# Patient Record
Sex: FEMALE | Race: BLACK OR AFRICAN AMERICAN | URBAN - METROPOLITAN AREA
[De-identification: names, ages, dates, MRNs, and addresses within clinical notes are randomized per-mention and may not be internally consistent; named-entity substitution may affect disease eponyms.]

---

## 2017-08-02 ENCOUNTER — APPOINTMENT (OUTPATIENT)
Dept: GENERAL RADIOLOGY | Facility: CLINIC | Age: 22
End: 2017-08-02
Attending: EMERGENCY MEDICINE
Payer: COMMERCIAL

## 2017-08-02 ENCOUNTER — HOSPITAL ENCOUNTER (EMERGENCY)
Facility: CLINIC | Age: 22
Discharge: PSYCHIATRIC OR SUBSTANCE USE FACILITY | End: 2017-08-03
Attending: EMERGENCY MEDICINE | Admitting: EMERGENCY MEDICINE
Payer: COMMERCIAL

## 2017-08-02 DIAGNOSIS — F29 PSYCHOSIS, UNSPECIFIED PSYCHOSIS TYPE (H): ICD-10-CM

## 2017-08-02 LAB
ALBUMIN SERPL-MCNC: 3.3 G/DL (ref 3.4–5)
ALP SERPL-CCNC: 61 U/L (ref 40–150)
ALT SERPL W P-5'-P-CCNC: 54 U/L (ref 0–50)
ANION GAP SERPL CALCULATED.3IONS-SCNC: 12 MMOL/L (ref 3–14)
AST SERPL W P-5'-P-CCNC: 38 U/L (ref 0–45)
BASOPHILS # BLD AUTO: 0 10E9/L (ref 0–0.2)
BASOPHILS NFR BLD AUTO: 0.3 %
BILIRUB SERPL-MCNC: 0.6 MG/DL (ref 0.2–1.3)
BUN SERPL-MCNC: 6 MG/DL (ref 7–30)
CALCIUM SERPL-MCNC: 9.3 MG/DL (ref 8.5–10.1)
CHLORIDE SERPL-SCNC: 104 MMOL/L (ref 94–109)
CO2 SERPL-SCNC: 22 MMOL/L (ref 20–32)
CREAT SERPL-MCNC: 0.54 MG/DL (ref 0.52–1.04)
DIFFERENTIAL METHOD BLD: ABNORMAL
EOSINOPHIL # BLD AUTO: 0.3 10E9/L (ref 0–0.7)
EOSINOPHIL NFR BLD AUTO: 2.5 %
ERYTHROCYTE [DISTWIDTH] IN BLOOD BY AUTOMATED COUNT: 14.6 % (ref 10–15)
GFR SERPL CREATININE-BSD FRML MDRD: ABNORMAL ML/MIN/1.7M2
GLUCOSE SERPL-MCNC: 94 MG/DL (ref 70–99)
HCG SERPL QL: NEGATIVE
HCT VFR BLD AUTO: 36.4 % (ref 35–47)
HGB BLD-MCNC: 12.5 G/DL (ref 11.7–15.7)
IMM GRANULOCYTES # BLD: 0 10E9/L (ref 0–0.4)
IMM GRANULOCYTES NFR BLD: 0.2 %
INTERPRETATION ECG - MUSE: NORMAL
LIPASE SERPL-CCNC: 110 U/L (ref 73–393)
LYMPHOCYTES # BLD AUTO: 1.5 10E9/L (ref 0.8–5.3)
LYMPHOCYTES NFR BLD AUTO: 11.6 %
MCH RBC QN AUTO: 27.5 PG (ref 26.5–33)
MCHC RBC AUTO-ENTMCNC: 34.3 G/DL (ref 31.5–36.5)
MCV RBC AUTO: 80 FL (ref 78–100)
MONOCYTES # BLD AUTO: 1.5 10E9/L (ref 0–1.3)
MONOCYTES NFR BLD AUTO: 11.5 %
NEUTROPHILS # BLD AUTO: 9.6 10E9/L (ref 1.6–8.3)
NEUTROPHILS NFR BLD AUTO: 73.9 %
NRBC # BLD AUTO: 0 10*3/UL
NRBC BLD AUTO-RTO: 0 /100
PLATELET # BLD AUTO: 419 10E9/L (ref 150–450)
POTASSIUM SERPL-SCNC: 3.2 MMOL/L (ref 3.4–5.3)
PROT SERPL-MCNC: 8 G/DL (ref 6.8–8.8)
RBC # BLD AUTO: 4.55 10E12/L (ref 3.8–5.2)
SODIUM SERPL-SCNC: 138 MMOL/L (ref 133–144)
WBC # BLD AUTO: 13 10E9/L (ref 4–11)

## 2017-08-02 PROCEDURE — 90791 PSYCH DIAGNOSTIC EVALUATION: CPT

## 2017-08-02 PROCEDURE — 93005 ELECTROCARDIOGRAM TRACING: CPT

## 2017-08-02 PROCEDURE — S0166 INJ OLANZAPINE 2.5MG: HCPCS | Performed by: EMERGENCY MEDICINE

## 2017-08-02 PROCEDURE — 80053 COMPREHEN METABOLIC PANEL: CPT | Performed by: EMERGENCY MEDICINE

## 2017-08-02 PROCEDURE — 99285 EMERGENCY DEPT VISIT HI MDM: CPT | Mod: 25

## 2017-08-02 PROCEDURE — 25000125 ZZHC RX 250: Performed by: EMERGENCY MEDICINE

## 2017-08-02 PROCEDURE — 96372 THER/PROPH/DIAG INJ SC/IM: CPT

## 2017-08-02 PROCEDURE — 85025 COMPLETE CBC W/AUTO DIFF WBC: CPT | Performed by: EMERGENCY MEDICINE

## 2017-08-02 PROCEDURE — 83690 ASSAY OF LIPASE: CPT | Performed by: EMERGENCY MEDICINE

## 2017-08-02 PROCEDURE — 71020 XR CHEST 2 VW: CPT

## 2017-08-02 PROCEDURE — 84703 CHORIONIC GONADOTROPIN ASSAY: CPT | Performed by: EMERGENCY MEDICINE

## 2017-08-02 PROCEDURE — 73130 X-RAY EXAM OF HAND: CPT | Mod: 50

## 2017-08-02 RX ORDER — OLANZAPINE 10 MG/2ML
10 INJECTION, POWDER, FOR SOLUTION INTRAMUSCULAR DAILY PRN
Status: DISCONTINUED | OUTPATIENT
Start: 2017-08-02 | End: 2017-08-03 | Stop reason: HOSPADM

## 2017-08-02 RX ORDER — OLANZAPINE 5 MG/1
5 TABLET, ORALLY DISINTEGRATING ORAL AT BEDTIME
Status: DISCONTINUED | OUTPATIENT
Start: 2017-08-02 | End: 2017-08-03 | Stop reason: HOSPADM

## 2017-08-02 RX ORDER — OLANZAPINE 10 MG/2ML
5 INJECTION, POWDER, FOR SOLUTION INTRAMUSCULAR DAILY PRN
Status: DISCONTINUED | OUTPATIENT
Start: 2017-08-02 | End: 2017-08-03 | Stop reason: HOSPADM

## 2017-08-02 RX ADMIN — OLANZAPINE 10 MG: 10 INJECTION, POWDER, FOR SOLUTION INTRAMUSCULAR at 19:31

## 2017-08-02 ASSESSMENT — ENCOUNTER SYMPTOMS
CHEST TIGHTNESS: 0
SHORTNESS OF BREATH: 0
COUGH: 1
ABDOMINAL PAIN: 0
WOUND: 1
BACK PAIN: 1
NAUSEA: 0
ARTHRALGIAS: 1
VOMITING: 0
HEADACHES: 1
DIFFICULTY URINATING: 0
MYALGIAS: 1
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
## 2017-08-02 NOTE — ED NOTES
"Pt awake and confused.  Speaking into her call light and stating, \"I want my president.  I was Micha Fierro!  I am not diabetic.  I do not need an amputation.\"  Pt referring to the TV commercials as if they were conversing with her.  Pt called 911 a few minutes later and told them that she needed a .  MD aware.  \"I just gave birth to a baby.\"  "
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
## 2017-08-02 NOTE — ED NOTES
"Discussed with SARS nurse on pt being unable to consent to exam.  SARS nurse reports pt stating: \"I'm a fucking [place] citizen.  I need a baby shower.\"  "

## 2017-08-03 ENCOUNTER — HOSPITAL ENCOUNTER (INPATIENT)
Facility: CLINIC | Age: 22
LOS: 26 days | Discharge: HOME | End: 2017-08-29
Attending: PSYCHIATRY & NEUROLOGY | Admitting: PSYCHIATRY & NEUROLOGY
Payer: COMMERCIAL

## 2017-08-03 VITALS
DIASTOLIC BLOOD PRESSURE: 65 MMHG | BODY MASS INDEX: 31.22 KG/M2 | OXYGEN SATURATION: 100 % | HEART RATE: 86 BPM | TEMPERATURE: 98.3 F | SYSTOLIC BLOOD PRESSURE: 110 MMHG | WEIGHT: 206 LBS | RESPIRATION RATE: 18 BRPM | HEIGHT: 68 IN

## 2017-08-03 DIAGNOSIS — F31.9 BIPOLAR I DISORDER (H): ICD-10-CM

## 2017-08-03 DIAGNOSIS — F25.0 SCHIZOAFFECTIVE DISORDER, BIPOLAR TYPE (H): ICD-10-CM

## 2017-08-03 DIAGNOSIS — F51.05 INSOMNIA DUE TO OTHER MENTAL DISORDER: Primary | ICD-10-CM

## 2017-08-03 DIAGNOSIS — F99 INSOMNIA DUE TO OTHER MENTAL DISORDER: Primary | ICD-10-CM

## 2017-08-03 PROBLEM — F29 PSYCHOSIS (H): Status: ACTIVE | Noted: 2017-08-03

## 2017-08-03 LAB
ALBUMIN UR-MCNC: 30 MG/DL
AMPHETAMINES UR QL SCN: ABNORMAL
APPEARANCE UR: CLEAR
BARBITURATES UR QL: ABNORMAL
BENZODIAZ UR QL: ABNORMAL
BILIRUB UR QL STRIP: ABNORMAL
CANNABINOIDS UR QL SCN: ABNORMAL
COCAINE UR QL: ABNORMAL
COLOR UR AUTO: YELLOW
ETHANOL UR QL SCN: ABNORMAL
GLUCOSE UR STRIP-MCNC: NEGATIVE MG/DL
HCG UR QL: NEGATIVE
HGB UR QL STRIP: NEGATIVE
KETONES UR STRIP-MCNC: NEGATIVE MG/DL
LEUKOCYTE ESTERASE UR QL STRIP: ABNORMAL
NITRATE UR QL: NEGATIVE
OPIATES UR QL SCN: ABNORMAL
PCP UR QL SCN: ABNORMAL
PH UR STRIP: 6.5 PH (ref 5–7)
SP GR UR STRIP: 1.02 (ref 1–1.03)
URN SPEC COLLECT METH UR: ABNORMAL
UROBILINOGEN UR STRIP-MCNC: >12 MG/DL (ref 0–2)

## 2017-08-03 PROCEDURE — 80307 DRUG TEST PRSMV CHEM ANLYZR: CPT | Performed by: PSYCHIATRY & NEUROLOGY

## 2017-08-03 PROCEDURE — 25000125 ZZHC RX 250: Performed by: PSYCHIATRY & NEUROLOGY

## 2017-08-03 PROCEDURE — S0166 INJ OLANZAPINE 2.5MG: HCPCS | Performed by: EMERGENCY MEDICINE

## 2017-08-03 PROCEDURE — 99223 1ST HOSP IP/OBS HIGH 75: CPT | Mod: AI | Performed by: PSYCHIATRY & NEUROLOGY

## 2017-08-03 PROCEDURE — 80320 DRUG SCREEN QUANTALCOHOLS: CPT | Performed by: PSYCHIATRY & NEUROLOGY

## 2017-08-03 PROCEDURE — 12400003 ZZH R&B MH CRITICAL UMMC

## 2017-08-03 PROCEDURE — 81003 URINALYSIS AUTO W/O SCOPE: CPT | Performed by: PSYCHIATRY & NEUROLOGY

## 2017-08-03 PROCEDURE — 25000132 ZZH RX MED GY IP 250 OP 250 PS 637: Performed by: PSYCHIATRY & NEUROLOGY

## 2017-08-03 PROCEDURE — 25000125 ZZHC RX 250: Performed by: EMERGENCY MEDICINE

## 2017-08-03 PROCEDURE — 81025 URINE PREGNANCY TEST: CPT | Performed by: PSYCHIATRY & NEUROLOGY

## 2017-08-03 PROCEDURE — S0166 INJ OLANZAPINE 2.5MG: HCPCS | Performed by: PSYCHIATRY & NEUROLOGY

## 2017-08-03 RX ORDER — OLANZAPINE 10 MG/2ML
10 INJECTION, POWDER, FOR SOLUTION INTRAMUSCULAR DAILY PRN
Status: DISCONTINUED | OUTPATIENT
Start: 2017-08-03 | End: 2017-08-03 | Stop reason: HOSPADM

## 2017-08-03 RX ORDER — TRAZODONE HYDROCHLORIDE 50 MG/1
50 TABLET, FILM COATED ORAL
Status: DISCONTINUED | OUTPATIENT
Start: 2017-08-03 | End: 2017-08-17

## 2017-08-03 RX ORDER — LORAZEPAM 1 MG/1
1 TABLET ORAL AT BEDTIME
Status: DISCONTINUED | OUTPATIENT
Start: 2017-08-03 | End: 2017-08-29 | Stop reason: HOSPADM

## 2017-08-03 RX ORDER — ACETAMINOPHEN 325 MG/1
650 TABLET ORAL EVERY 4 HOURS PRN
Status: DISCONTINUED | OUTPATIENT
Start: 2017-08-03 | End: 2017-08-23

## 2017-08-03 RX ORDER — BISACODYL 10 MG
10 SUPPOSITORY, RECTAL RECTAL DAILY PRN
Status: DISCONTINUED | OUTPATIENT
Start: 2017-08-03 | End: 2017-08-29 | Stop reason: HOSPADM

## 2017-08-03 RX ORDER — ALUMINA, MAGNESIA, AND SIMETHICONE 2400; 2400; 240 MG/30ML; MG/30ML; MG/30ML
30 SUSPENSION ORAL EVERY 4 HOURS PRN
Status: DISCONTINUED | OUTPATIENT
Start: 2017-08-03 | End: 2017-08-29 | Stop reason: HOSPADM

## 2017-08-03 RX ORDER — OLANZAPINE 10 MG/2ML
10 INJECTION, POWDER, FOR SOLUTION INTRAMUSCULAR
Status: DISCONTINUED | OUTPATIENT
Start: 2017-08-03 | End: 2017-08-03

## 2017-08-03 RX ORDER — HYDROXYZINE HYDROCHLORIDE 25 MG/1
25-50 TABLET, FILM COATED ORAL EVERY 4 HOURS PRN
Status: DISCONTINUED | OUTPATIENT
Start: 2017-08-03 | End: 2017-08-29 | Stop reason: HOSPADM

## 2017-08-03 RX ORDER — HALOPERIDOL 5 MG/1
5 TABLET ORAL EVERY 6 HOURS PRN
Status: DISCONTINUED | OUTPATIENT
Start: 2017-08-03 | End: 2017-08-10

## 2017-08-03 RX ORDER — OLANZAPINE 10 MG/1
10 TABLET ORAL
Status: DISCONTINUED | OUTPATIENT
Start: 2017-08-03 | End: 2017-08-03

## 2017-08-03 RX ORDER — LORAZEPAM 1 MG/1
1-2 TABLET ORAL EVERY 6 HOURS PRN
Status: DISCONTINUED | OUTPATIENT
Start: 2017-08-03 | End: 2017-08-10

## 2017-08-03 RX ORDER — HALOPERIDOL 5 MG/ML
5 INJECTION INTRAMUSCULAR EVERY 6 HOURS PRN
Status: DISCONTINUED | OUTPATIENT
Start: 2017-08-03 | End: 2017-08-10

## 2017-08-03 RX ORDER — BENZTROPINE MESYLATE 1 MG/ML
1 INJECTION, SOLUTION INTRAMUSCULAR; INTRAVENOUS EVERY 6 HOURS PRN
Status: DISCONTINUED | OUTPATIENT
Start: 2017-08-03 | End: 2017-08-10

## 2017-08-03 RX ORDER — BENZTROPINE MESYLATE 1 MG/1
1 TABLET ORAL EVERY 6 HOURS PRN
Status: DISCONTINUED | OUTPATIENT
Start: 2017-08-03 | End: 2017-08-10

## 2017-08-03 RX ORDER — LORAZEPAM 2 MG/ML
1-2 INJECTION INTRAMUSCULAR EVERY 6 HOURS PRN
Status: DISCONTINUED | OUTPATIENT
Start: 2017-08-03 | End: 2017-08-10

## 2017-08-03 RX ORDER — OLANZAPINE 20 MG/1
20 TABLET ORAL AT BEDTIME
Status: DISCONTINUED | OUTPATIENT
Start: 2017-08-03 | End: 2017-08-08

## 2017-08-03 RX ADMIN — OLANZAPINE 10 MG: 10 INJECTION, POWDER, LYOPHILIZED, FOR SOLUTION INTRAMUSCULAR at 06:29

## 2017-08-03 RX ADMIN — LORAZEPAM 2 MG: 1 TABLET ORAL at 08:12

## 2017-08-03 RX ADMIN — OLANZAPINE 20 MG: 20 TABLET, FILM COATED ORAL at 21:07

## 2017-08-03 RX ADMIN — LORAZEPAM 1 MG: 1 TABLET ORAL at 21:07

## 2017-08-03 RX ADMIN — BENZTROPINE MESYLATE 1 MG: 1 TABLET ORAL at 08:12

## 2017-08-03 RX ADMIN — OLANZAPINE 10 MG: 10 INJECTION, POWDER, FOR SOLUTION INTRAMUSCULAR at 00:44

## 2017-08-03 ASSESSMENT — ACTIVITIES OF DAILY LIVING (ADL)
LAUNDRY: WITH SUPERVISION
DRESS: INDEPENDENT
GROOMING: INDEPENDENT
ORAL_HYGIENE: INDEPENDENT
GROOMING: INDEPENDENT
ORAL_HYGIENE: INDEPENDENT
DRESS: SCRUBS (BEHAVIORAL HEALTH);INDEPENDENT
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
## 2017-08-03 NOTE — PROGRESS NOTES
PATIENT BELONGINGS:    Items in Patient Locker:  -1 pair of sandals, 5 multicolored sandals, 1 monochrome shawl/head covering.    ---No Cash, No Credit/Debit Cards, and No Medications (other than noted above) at the time of admission.    ADMISSION:  I am responsible for any personal items that are not sent to the safe or pharmacy. [facility] is not responsible for loss, theft or damage of any property in my possession.    Patient Signature _____________________ Date/Time _____________________    Staff Signature _______________________ Date/Time _____________________    2nd Staff person, if patient is unable/unwilling to sign  ___________________________________ Date/Time _____________________  DISCHARGE:  All personal items have been returned to me.    Patient Signature _____________________ Date/Time _____________________    Staff Signature _______________________ Date/Time _____________________
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
## 2017-08-03 NOTE — PLAN OF CARE
Problem: Psychotic Symptoms  Goal: Social and Therapeutic (Psychotic Symptoms)  Signs and symptoms of listed problems will be absent or manageable.      Pt did not attend any OT groups today.
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
## 2017-08-03 NOTE — IP AVS SNAPSHOT
[address]    [address]    [place] [address]    Phone:  [phone]                                       After Visit Summary   8/3/2017    Christine Ryan    MRN: [id number]           After Visit Summary Signature Page     I have received my discharge instructions, and my questions have been answered. I have discussed any challenges I see with this plan with the nurse or doctor.    ..........................................................................................................................................  Patient/Patient Representative Signature      ..........................................................................................................................................  Patient Representative Print Name and Relationship to Patient    ..................................................               ................................................  Date                                            Time    ..........................................................................................................................................  Reviewed by Signature/Title    ...................................................              ..............................................  Date                                                            Time

## 2017-08-03 NOTE — IP AVS SNAPSHOT
MRN:[id number]                      After Visit Summary   8/3/2017    Christine Ryan    MRN: [id number]           Thank you!     Thank you for choosing [facility] for your care. Our goal is always to provide you with excellent care.        Patient Information     Date Of Birth          1995        Designated Caregiver       Most Recent Value    Caregiver    Will someone help with your care after discharge? no      About your hospital stay     You were admitted on:  August 3, 2017 You last received care in the:  [facility] 32NR    You were discharged on:  August 29, 2017       Who to Call     For medical emergencies, please call 911.  For non-urgent questions about your medical care, please call your primary care provider or clinic, None          Attending Provider     Provider Jayson Radford, Kobi Martinez MD Psychiatry    Eric, Konrad Spicer MD Psychiatry    Rosana, Luis Carlos Guillaume MD Psychiatry       Primary Care Provider    Physician No Ref-Primary      Further instructions from your care team        Behavioral Discharge Planning and Instructions      Summary:  You were admitted on 8/3/2017  For Disorganized Thinking/Behaviors.  You were treated by Cassy Anderson NP and Dr. Rosana MD and Dr. Rodney. You were discharged on 8/29/2017 from [facility] to Home    You were dually committed to the [facility] and the [facility] on 8/21/2017 and you are being discharged on a Provisional Discharge Agreement which shall remain in effect for the duration of the Commitment which expires on 2/21/2018    You are also court ordered to take the medications that the doctor ordered for you.       Main Diagnosis:   Bipolar 1 Disorder - most recent episode manic with psychosis.     Health Care Follow-up Appointments:   Psychiatry Date/Time: Thursday 9/21/2017 at 8:20 am    Provider: Vonda Choe  Address: [facility]  [address] [place]  [address]  Phone: [phone]  Fax: [phone]    You will be assigned a county . Until that person is assigned, you will work with Hallie from [facility].   Phone: [phone]  Fax: [phone]    [facility] diagnostic intake: Thursday 9/7/2017 at 9:30     : Iraida Schmitz  Address: [facility]  [address], [place] [address]  Phone: [phone]  Fax: [phone]    Day treatment intake: [facility] will call you to schedule the day treatment intake. Ibeth is the .  Phone: [phone]  Fax: [phone]  Attend all scheduled appointments with your outpatient providers. Call at least 24 hours in advance if you need to reschedule an appointment to ensure continued access to your outpatient providers.     Major Treatments, Procedures and Findings:  You were provided with: a psychiatric assessment, assessed for medical stability, medication evaluation and/or management, group therapy and milieu management    Symptoms to Report: feeling more aggressive, increased confusion, losing more sleep, mood getting worse, thoughts of suicide or not taking your medication    Early warning signs can include: increased depression or anxiety sleep disturbances increased thoughts or behaviors of suicide or self-harm  increased unusual thinking, such as paranoia or hearing voices    Safety and Wellness:  Take all medicines as directed.  Make no changes unless your doctor suggests them.      Follow treatment recommendations.  Refrain from alcohol and non-prescribed drugs.  If there is a concern for safety, call [phone].    Resources:   National [place] on Mental Illness (www.[place].radha.org): 219.113.2691 or 354-596-1599.  Alcoholics Anonymous (www.alcoholics-anonymous.org): Check your phone book for your local chapter.  Suicide Awareness Voices of Education (SAVE) (www.save.org): 043-457-WVOS (9271)  National Suicide Prevention Line  "(www.mentalhealthmn.org): 331-930-KHOS (8255)  Mental Health Consumer/Survivor Network of [place] (www.mhcsn.net): 632.542.9843 or 672-572-6588  Mental Health Association of [place] (www.mentalhealth.org): 368.537.7286 or 022-281-2034  [facility] Mobile Crisis Response Team (CRT):  [phone] or [phone]     The treatment team has appreciated the opportunity to work with you.     If you have any questions or concerns our unit number is [phone]  You may be receiving a follow-up phone call within the next three days from a representative from behavioral [facility].    You have identified the best phone number to reach you as [phone]          Pending Results     No orders found from 2017 to 2017.            Admission Information     Date & Time Provider Department Dept. Phone    8/3/2017 Luis Carlos Wayne MD UR 32NR [phone]      Your Vitals Were     Blood Pressure Pulse Temperature Respirations Weight Last Period    121/87 84 98.2  F (36.8  C) (Tympanic) 16 75.9 kg (167 lb 4.8 oz) (LMP Unknown)    BMI (Body Mass Index)                   25.44 kg/m2           MyChart Information     [facility] lets you send messages to your doctor, view your test results, renew your prescriptions, schedule appointments and more. To sign up, go to [email or web address] . Click on \"Log in\" on the left side of the screen, which will take you to the Welcome page. Then click on \"Sign up Now\" on the right side of the page.     You will be asked to enter the access code listed below, as well as some personal information. Please follow the directions to create your username and password.     Your access code is: [email or web address]  Expires: 10/31/2017 10:47 PM     Your access code will  in 90 days. If you need help or a new code, please call your [facility] or [phone].        Care EveryWhere ID     This is your Care EveryWhere ID. This could be used by other " organizations to access your [facility] medical records  [id number]        Equal Access to Services     JOYA ENCISO : David Acosta, toro garibay, joseph sam, evon rocha. So [facility] [phone].    ATENCIÓN: Si habla español, tiene a armstrong disposición servicios gratuitos de asistencia lingüística. Meghan al [phone].    We comply with applicable federal civil rights laws and [place] laws. We do not discriminate on the basis of race, color, national origin, age, disability sex, sexual orientation or gender identity.               Review of your medicines      START taking        Dose / Directions    benztropine 1 MG tablet   Commonly known as:  COGENTIN        Dose:  1 mg   Take 1 tablet (1 mg) by mouth 2 times daily as needed (take as needed if experiencing extrapyramidal side effects such as rigidity or tremors)   Quantity:  60 tablet   Refills:  0       lithium 450 MG CR tablet   Commonly known as:  ESKALITH        Dose:  450 mg   Take 1 tablet (450 mg) by mouth every 12 hours   Quantity:  60 tablet   Refills:  0       LORazepam 1 MG tablet   Commonly known as:  ATIVAN        Dose:  1 mg   Take 1 tablet (1 mg) by mouth At Bedtime   Quantity:  30 tablet   Refills:  0       melatonin 3 MG tablet        Dose:  3 mg   Take 1 tablet (3 mg) by mouth At Bedtime   Quantity:  30 tablet   Refills:  0       risperiDONE 4 MG tablet   Commonly known as:  risperDAL        Dose:  4 mg   Take 1 tablet (4 mg) by mouth At Bedtime   Quantity:  30 tablet   Refills:  0         CONTINUE these medicines which have NOT CHANGED        Dose / Directions    TYLENOL PO        Refills:  0            Where to get your medicines      These medications were sent to [facility] [place], [place] - [address]  [address] [address]     Phone:  [phone]     lithium 450 MG CR tablet    melatonin 3 MG tablet    risperiDONE 4 MG tablet          Some of these will need a paper prescription and others can be bought over the counter. Ask your nurse if you have questions.     Bring a paper prescription for each of these medications     benztropine 1 MG tablet    LORazepam 1 MG tablet                Protect others around you: Learn how to safely use, store and throw away your medicines at www.disposemymeds.org.             Medication List: This is a list of all your medications and when to take them. Check marks below indicate your daily home schedule. Keep this list as a reference.      Medications           Morning Afternoon Evening Bedtime As Needed    benztropine 1 MG tablet   Commonly known as:  COGENTIN   Take 1 tablet (1 mg) by mouth 2 times daily as needed (take as needed if experiencing extrapyramidal side effects such as rigidity or tremors)   Last time this was given:  1 mg on 8/11/2017  4:40 PM                                lithium 450 MG CR tablet   Commonly known as:  ESKALITH   Take 1 tablet (450 mg) by mouth every 12 hours   Last time this was given:  450 mg on 8/29/2017  8:00 AM                                LORazepam 1 MG tablet   Commonly known as:  ATIVAN   Take 1 tablet (1 mg) by mouth At Bedtime   Last time this was given:  1 mg on 8/28/2017  8:59 PM                                melatonin 3 MG tablet   Take 1 tablet (3 mg) by mouth At Bedtime   Last time this was given:  3 mg on 8/28/2017  8:59 PM                                risperiDONE 4 MG tablet   Commonly known as:  risperDAL   Take 1 tablet (4 mg) by mouth At Bedtime   Last time this was given:  4 mg on 8/28/2017  8:59 PM                                TYLENOL PO   Last time this was given:  975 mg on 8/24/2017  3:53 PM

## 2017-08-03 NOTE — ED NOTES
"Yelling at RN: \"You fucking bitch; I hate you; Fuck you; get away from me\"  Refusing zyprexa IM; swinging her fists.  Swearing.  Security, RNs, and EDTs in room for safe take-down in room: 5pt restraints applied with MD jordan and orders.  Privacy maintained during IM injection.  Still yelling and swearing after restraints applied.  Yelling, \"I see you hiding fucking nurse,\"  RN charting in nurses station.  BP cuff and O2 monitoring on patient.  Yelling at another patient as he was on cell phone, calling for a ride, while he walked passed the room.    "
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
## 2017-08-03 NOTE — ED NOTES
RN called [phone] to give report.  Spoke with GABRIELLE Whelan.  Tip took limited report and stated to call back at 0000 with detailed report for transport at 0030.  Will transport with restraints via ACLS and Tip requested zyprexa to be given at 0000.  MD aware and ordered 5mg IM zyprexa for 0000.

## 2017-08-03 NOTE — PROGRESS NOTES
Pt has been swearing and verbally abusive to staff and RN, this am. She said she would come out of her room, though staff encouraged her not to-pending MERS eval. She took po cogentin and ativan, after about 20 minutes of encouragement. She spit out haldol twice. She has a runny nose; whitish, yellow discharge. Pt refuses vitals. Reema charge RN spoke with infection control re if pt needed to be on precautions for MERS. IC said she does not. Paged Dr Radford, as no IM consult ordered on admission.
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
## 2017-08-03 NOTE — PROGRESS NOTES
Staff report pt had an emesis after she ate lunch. She was given Sprite, and is visiting with Dr Radford. Pt's father is also here. Pt had said she could get a urine spec also, but then was unable to void. Will attempt again to get vitals, when she is done visiting with     1420) Staff were able to get pt's temp (98.2), but she refused BP/P. Per staff report, she was swearing at her mother, who is also visiting.

## 2017-08-03 NOTE — ED NOTES
"RN tried to remove restraints.  \"Will you hurt staff if I take restraints off?\" shook head no.  \"Will you hurt yourself if I take you restraints off?\"  She nodded her head yes.  I repeated the question: \"So, let me confirm. If I take these restraints off you feel like you will hurt yourself?\"  Patient nodded her head.  Restraints remain on.  Will reassess   "

## 2017-08-04 PROCEDURE — 25000128 H RX IP 250 OP 636: Performed by: PSYCHIATRY & NEUROLOGY

## 2017-08-04 PROCEDURE — 12400003 ZZH R&B MH CRITICAL UMMC

## 2017-08-04 PROCEDURE — 25000132 ZZH RX MED GY IP 250 OP 250 PS 637: Performed by: PSYCHIATRY & NEUROLOGY

## 2017-08-04 RX ORDER — OLANZAPINE 10 MG/2ML
10 INJECTION, POWDER, FOR SOLUTION INTRAMUSCULAR DAILY PRN
Status: DISCONTINUED | OUTPATIENT
Start: 2017-08-04 | End: 2017-08-10

## 2017-08-04 RX ADMIN — HALOPERIDOL LACTATE 5 MG: 5 INJECTION, SOLUTION INTRAMUSCULAR at 14:36

## 2017-08-04 RX ADMIN — HALOPERIDOL LACTATE 5 MG: 5 INJECTION, SOLUTION INTRAMUSCULAR at 06:25

## 2017-08-04 RX ADMIN — LORAZEPAM 2 MG: 2 INJECTION INTRAMUSCULAR; INTRAVENOUS at 14:37

## 2017-08-04 RX ADMIN — BENZTROPINE MESYLATE 1 MG: 1 INJECTION INTRAMUSCULAR; INTRAVENOUS at 06:26

## 2017-08-04 RX ADMIN — LORAZEPAM 2 MG: 2 INJECTION INTRAMUSCULAR; INTRAVENOUS at 06:24

## 2017-08-04 RX ADMIN — LORAZEPAM 1 MG: 1 TABLET ORAL at 19:22

## 2017-08-04 RX ADMIN — OLANZAPINE 20 MG: 20 TABLET, FILM COATED ORAL at 19:22

## 2017-08-04 RX ADMIN — BENZTROPINE MESYLATE 1 MG: 1 INJECTION INTRAMUSCULAR; INTRAVENOUS at 14:40

## 2017-08-04 ASSESSMENT — ACTIVITIES OF DAILY LIVING (ADL)
GROOMING: INDEPENDENT
ORAL_HYGIENE: INDEPENDENT
ORAL_HYGIENE: INDEPENDENT;PROMPTS
DRESS: STREET CLOTHES;SCRUBS (BEHAVIORAL HEALTH);INDEPENDENT;PROMPTS
GROOMING: INDEPENDENT;PROMPTS

## 2017-08-04 NOTE — PROGRESS NOTES
08/04/17 1400   General Information   Has Not Attended OT as of: 08/04/17   General Observation/Plan   General Observations/Plan See Comments     O.T. NON ATTENDANCE:Has not attended yet. During first group attempted to force her way into group despite 1:1 staff trying to close door, pt not accepting direction, was told we would try group when she was doing better. During 2nd group came in quietly, sat down. (1:1 staff with her). OTR decided to try pt in group since was calmer. Pt was given 2 choices of coloring task, wanted both, able to pick 1 to start. Began falling asleep and leaning off to left side in chair. OTR saved projects, pt excused to rest. .Pt not billed for brief time in group.  Will evaluate ability to participate, and evaluate function upon attending.

## 2017-08-04 NOTE — PROGRESS NOTES
"Patient currently in seclusion.   RN introduced herself and asked patient' \"do you know why you were placed in seclusion?\"  Patient began using foul language and talking about nurses taking an \"oath.\"  Patient began banging on door. Patient began blaming psych associate for the reason she was in seclusion.  It was determined that patient would need to remain in seclusion at this time.  Patient's parents were out in [facility].  Parents from [place].  This writer went and explained the situation to parents.  Parents verbalized understanding.  They would like to stay an additional 30 minutes to see if patient is able to be removed from seclusion.  "

## 2017-08-04 NOTE — PROGRESS NOTES
Pt started yelling and throwing things in room, started banging on room door handle, yelling obscenities, patient in adjacent room said he could no longer take pt's disruptive behavior, pt offered oral medications during the night and refused to take them. Dr. Urban was notified re forced IM medication to be given to patient. Pt was given IM Haldol 1 mg, Ativan IM 2 mg, and Cogentin 1 mg as ordered for agitation. Pt is currently on Status Individual Observation, appeared to sleep approximately 4 hours.

## 2017-08-04 NOTE — PROGRESS NOTES
CTC met with pt who signed Care Everywhere. Pt also daylin din voluntarily. During that discussion, pt did dispute the 72 hour hold. CTC advised her concerning the hold. Also emphasized with pt that she is signing in voluntarily in order to work with the provider and team; that she should not sign in with the intent to discharge but to work with the team.

## 2017-08-04 NOTE — PROGRESS NOTES
Patient was removed from seclusion from 1722.  Patient did use some foul language at staff.  Patient responding to hallucinations.  Patient sleepy but upon evaluation it was determined by this writer that patient would be removed from seclusion.  Patient's SIO continues.  Patient's parents were allowed to visit with her in her room.  Family was concerned she had not eaten today.  Patient appeared to not have eaten her lunch and this writer informed family. At this time, family remains with patient and patient has been calm and appropriate.  Will continue to monitor patient closely.

## 2017-08-04 NOTE — PLAN OF CARE
"Problem: Psychotic Symptoms  Goal: Psychotic Symptoms  Signs and symptoms of listed problems will be absent or manageable.   Outcome: No Change  Christine continues on SIO-several angry outbursts, barrington towards female staff, but redirectable-very social with select peers-refused several offers of PRN medication-states she will take HS meds to assist with sleep-very irritable-labile mood-appears drowsy, but refused to rest in bed-Addendum-urine sent for ordered labs-Christine relieved HCG again negative-did fall asleep in lounge, but woke post brief period of rest-accepted prescribed HS meds-states she is \"out of gas\" and fears she is heading for a crash landing-expressed fear she is dying-reassured she is in hospital and will be monitored closely-continues random angry statements, although less hostile later in shift         "

## 2017-08-04 NOTE — PROGRESS NOTES
08/04/17 1457   Justification   Clinical Justification Others     Patient was unable to stop touching others. She was swearing when redirected. She refused to take medications. She was not following any directions from staff, and was not able to be redirected. She was shouting loudly at the desk, and given that she was approaching other patient's while agitated necessitated the need to walk her down to seclusion. She was given IM haldol, IM Ativan, and IM Cogentin. Patient was placed in seclusion at 1440.

## 2017-08-05 PROCEDURE — 25000132 ZZH RX MED GY IP 250 OP 250 PS 637: Performed by: PSYCHIATRY & NEUROLOGY

## 2017-08-05 PROCEDURE — 12400003 ZZH R&B MH CRITICAL UMMC

## 2017-08-05 RX ADMIN — BENZTROPINE MESYLATE 1 MG: 1 TABLET ORAL at 15:09

## 2017-08-05 RX ADMIN — LORAZEPAM 2 MG: 1 TABLET ORAL at 07:54

## 2017-08-05 RX ADMIN — OLANZAPINE 20 MG: 20 TABLET, FILM COATED ORAL at 19:20

## 2017-08-05 RX ADMIN — LORAZEPAM 2 MG: 1 TABLET ORAL at 15:09

## 2017-08-05 RX ADMIN — LORAZEPAM 1 MG: 1 TABLET ORAL at 19:20

## 2017-08-05 RX ADMIN — HALOPERIDOL 5 MG: 5 TABLET ORAL at 15:09

## 2017-08-05 ASSESSMENT — ACTIVITIES OF DAILY LIVING (ADL)
ORAL_HYGIENE: INDEPENDENT
GROOMING: INDEPENDENT
LAUNDRY: WITH SUPERVISION
DRESS: SCRUBS (BEHAVIORAL HEALTH)
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
## 2017-08-05 NOTE — PROGRESS NOTES
Patient continues to swear at staff and calls female staff bitches. She is intrusive with other patients and their visitors and has to be redirected constantly.

## 2017-08-05 NOTE — PROGRESS NOTES
Patient continues on SIO.  Patient took evening medications with the help of male staff.  Patient appears to do better with male staff than female.  Patient fell asleep in lounge and was helped to her room where she would not lay down on mattress but fell asleep sitting in chair.  Feet propped on another chair.  Patient has been sleeping for the last few hours.  Will continue to monitor.

## 2017-08-05 NOTE — PROGRESS NOTES
Status Individual Observation continues for this patient, responds better to male staff,  mumbled nonsensical statements,and profanities, told female Psych , just don't f---ing understand I'm not lesbian, I'm straight. Pt's sleep interrupted during the night, slept approximately 4.25 hours.

## 2017-08-06 PROCEDURE — 25000132 ZZH RX MED GY IP 250 OP 250 PS 637: Performed by: PSYCHIATRY & NEUROLOGY

## 2017-08-06 PROCEDURE — 12400003 ZZH R&B MH CRITICAL UMMC

## 2017-08-06 RX ADMIN — LORAZEPAM 1 MG: 1 TABLET ORAL at 20:09

## 2017-08-06 RX ADMIN — HYDROXYZINE HYDROCHLORIDE 50 MG: 25 TABLET ORAL at 07:47

## 2017-08-06 RX ADMIN — ALUMINUM HYDROXIDE, MAGNESIUM HYDROXIDE, AND DIMETHICONE 30 ML: 400; 400; 40 SUSPENSION ORAL at 16:19

## 2017-08-06 RX ADMIN — HALOPERIDOL 5 MG: 5 TABLET ORAL at 07:48

## 2017-08-06 RX ADMIN — OLANZAPINE 20 MG: 20 TABLET, FILM COATED ORAL at 20:10

## 2017-08-06 RX ADMIN — LORAZEPAM 2 MG: 1 TABLET ORAL at 07:48

## 2017-08-06 ASSESSMENT — ACTIVITIES OF DAILY LIVING (ADL)
GROOMING: HANDWASHING;INDEPENDENT
ORAL_HYGIENE: INDEPENDENT
DRESS: STREET CLOTHES
ORAL_HYGIENE: INDEPENDENT
DRESS: STREET CLOTHES;INDEPENDENT
HYGIENE/GROOMING: INDEPENDENT

## 2017-08-06 NOTE — PROGRESS NOTES
Pt continues to be on Status Individual Observation, requires frequent redirection, awakened after sleeping approximately 5 hours, went to bathroom stood outside of bathroom and  started mumbling obscenities, will continue to monitor for safety.

## 2017-08-06 NOTE — PROGRESS NOTES
Pt awake most of the shift.  Pt ate meals, tries to be social but mumbles to incoherency.  Pt had a couple outbursts, with vulgar language and obstenance. Pt required redirection.  Pt has paraniod thoughts that others are talking about her.  Poor insight and judgement.       08/06/17 5377   Behavioral Health   Hallucinations denies / not responding to hallucinations   Thinking delusional;paranoid   Orientation person: oriented;place: oriented   Memory temporary   Insight poor   Judgement impaired   Eye Contact drooping;at examiner   Affect full range affect   Mood labile;irritable   Physical Appearance/Attire untidy   Hygiene neglected grooming - unclean body, hair, teeth   Suicidality other (see comments)  (denies)   Elopement Statements about wanting to leave   Activity restless   Speech incoherent;rambling;tangential   Psychomotor / Gait slow;balanced;steady   Sleep/Rest/Relaxation   Day/Evening Time Hours up all shift   Coping/Psychosocial   Verbalized Emotional State anger;disbelief;frustration;powerlessness   Plan Of Care Reviewed With patient   Psycho Education   Type of Intervention 1:1 intervention   Response participates with encouragement   Activities of Daily Living   Hygiene/Grooming handwashing;independent   Oral Hygiene independent   Dress street clothes;independent   Activity   Activity Level of Assistance independent   Behavioral Health Interventions   Depression provide emotional support;assist with developing and utilizing healthy coping strategies;assess patient response to medication   Social and Therapeutic Interventions (Depression) encourage effective boundaries with peers;encourage participation in therapeutic groups and milieu activities

## 2017-08-06 NOTE — PROGRESS NOTES
"Patient continues to have unusual thoughts, yelling and shouting obscenities saying \" fuck, I don't care\". Patient continues calling staff and peers obscene names \" you fucking bitch\". Patient is forgetful, somehow redirectable. Was given prn medications with some improvement. Patient exhibiting some lack of insight, appears confused, excessive talking and low energy. Patient did not require restraints and seclusion during this shift.  Patient is on SIO and it was renewed by on-call doctor.   "

## 2017-08-06 NOTE — PROGRESS NOTES
"SPIRITUAL HEALTH SERVICES  SPIRITUAL ASSESSMENT Progress Note  [facility] 32N     REFERRAL SOURCE: Patient's nurse requested I visit while on unit this morning.    I introduced SHS to Christine.  Christine was flipping pages of a bible and speaking very quietly that I could not make out what she wanted to discuss.  When I asked her if she wanted to talk about anything she opened the bible to a page to record births and marriages.  She said, \"I want to talk about births and marriages.\"  Her voice was louder and she looked upset as she began talking about being in [place] but I could not follow her statements.  I suggested we do some deep breathing together and say a prayer for her.  We did a few deep breaths together and said a prayer for her to know peace, safety and care in the hospital.  She appeared calmer after the prayer.  I told her that I would return in a few days to visit again.  I followed-up with with Christine's nurse afterward.    PLAN: I will follow-up with Christine later this week as she remains on 32N.  [facility] remains available for the duration of Christine's hospitalization.     Romaine Loza MDiv.  Chaplain Resident  Pager [phone]  "

## 2017-08-07 PROCEDURE — 90853 GROUP PSYCHOTHERAPY: CPT

## 2017-08-07 PROCEDURE — 99232 SBSQ HOSP IP/OBS MODERATE 35: CPT | Performed by: PSYCHIATRY & NEUROLOGY

## 2017-08-07 PROCEDURE — 12400007 ZZH R&B MH INTERMEDIATE UMMC

## 2017-08-07 PROCEDURE — 25000132 ZZH RX MED GY IP 250 OP 250 PS 637: Performed by: PSYCHIATRY & NEUROLOGY

## 2017-08-07 RX ADMIN — ACETAMINOPHEN 650 MG: 325 TABLET, FILM COATED ORAL at 14:05

## 2017-08-07 ASSESSMENT — ACTIVITIES OF DAILY LIVING (ADL)
GROOMING: PROMPTS
ORAL_HYGIENE: PROMPTS
GROOMING: INDEPENDENT
LAUNDRY: WITH SUPERVISION
DRESS: PROMPTS
LAUNDRY: WITH SUPERVISION
DRESS: STREET CLOTHES;INDEPENDENT
ORAL_HYGIENE: INDEPENDENT
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
## 2017-08-07 NOTE — PROGRESS NOTES
08/06/17 1453   Behavioral Health   Hallucinations denies / not responding to hallucinations   Thinking delusional;paranoid   Orientation person: oriented;place: oriented   Memory temporary   Insight poor   Judgement impaired   Eye Contact drooping   Affect full range affect   Mood irritable   Physical Appearance/Attire untidy   Hygiene neglected grooming - unclean body, hair, teeth   Activities of Daily Living   Hygiene/Grooming independent   Oral Hygiene independent   Dress street clothes     PT. Was irritable and agitated. PT. Was redirected by staff several times. PT. Had a visit from parents. PT. Was using unacceptable language in the unge and was redirected by staff.

## 2017-08-07 NOTE — PROGRESS NOTES
Attended afternoon OT group focused on topic of Resiliency. She stated interest in talking about topic, expanded on answers that began less detailed and added additional information. Stated feeling very tired and left a few minutes early. Will encourage attendance, assess further, set goal focus and explain the benefits of OT participation.

## 2017-08-07 NOTE — PROGRESS NOTES
"[facility], [place]   Psychiatric Progress Note         Interim History and Subjective Reports:   The patient's care was discussed with the treatment team during the daily team meeting.  Staff reports:  less disorganized, less intrusive, taking medications, no hostility    \"I'm getting better.\"  The patient notes improvement in her mood with decreased irritability.   Sleep was poor, energy is fair, concentration is limited however improving, appetite is normal.   She denied suicidal and homicidal thoughts.   She denied auditory and visual hallucinations. She briefly reported discomfort around others resembling vague paranoia however did not elaborate.   Tolerating medications with mild sedation however tolerable.   She is hoping to be discharged home soon.         Scheduled Medications:       OLANZapine  20 mg Oral At Bedtime     LORazepam  1 mg Oral At Bedtime          Allergies:    No Known Allergies       Labs:   No results found for this or any previous visit (from the past 24 hour(s)).       Vitals:   /57  Pulse 106  Temp 98.2  F (36.8  C) (Tympanic)  Resp 16  LMP  (LMP Unknown)  Weight: 0 lbs 0 oz          Height: Data Unavailable           There is no height or weight on file to calculate BMI.        Mental Status Examination:   Appearance: awake, alert  Attitude:  cooperative  Eye Contact:  fair  Mood:  better  Affect:  intensity is heightened and Mild  Speech:  Mildly rapid  Psychomotor Behavior:  no evidence of tardive dyskinesia, dystonia, or tics  Throught Process:  tangental and Mild  Associations:  no loose associations  Thought Content:  no evidence of suicidal ideation or homicidal ideation and  suspect paranoia although less prominent  Insight:  partial  Judgement:  fair  Oriented to:  time, person, and place  Attention Span and Concentration:  intact  Recent and Remote Memory:  fair         DIagnoses:     Bipolar disorder type I - most recent episode manic " with psychosis         Plan:   1. Biological treatments:  -- Continue Zyprexa for mood stabilization, currently being augmented with Ativan which can gradually be tapered down as improvements are gained; sedation is moderate. Monitor response and tolerability.     2. Psychosocial treatments:   --addressed with SW consult and groups  --d/c SIO noting some reduction in agitation and thought disorganization   --plan to contact her father to discuss discharge plans    3. Dispo:  -- the patient will return home with her parents once she has gained adequate mood stabilization and reduction of psychosis. Improvements have been noted. Consider discharge home later this week.     Transfer care to [facility] beginning tomorrow.   Transfer note: the patient was admitted for severe abel and psychosis noting symptom relapse mid July with progressive worsening. She was initially placed on SIO precautions given her level of disorganization and poor boundaries around others (touching peers and wandering into other rooms). She had been off of Zyprexa for approximately 6 months prior to this episode. Previously gained remission of symptoms when compliant with Zyprexa which has been restarted and augmented with Ativan. Awaiting response to treatment then plan to transition back home without patient referrals. I met with her mother and father last week who appear to be positive supports for the patient.

## 2017-08-08 PROCEDURE — 99207 ZZC CDG-MDM COMPONENT: MEETS MODERATE - UP CODED: CPT | Performed by: NURSE PRACTITIONER

## 2017-08-08 PROCEDURE — 99233 SBSQ HOSP IP/OBS HIGH 50: CPT | Performed by: NURSE PRACTITIONER

## 2017-08-08 PROCEDURE — 12400007 ZZH R&B MH INTERMEDIATE UMMC

## 2017-08-08 PROCEDURE — 25000132 ZZH RX MED GY IP 250 OP 250 PS 637: Performed by: NURSE PRACTITIONER

## 2017-08-08 PROCEDURE — 25000132 ZZH RX MED GY IP 250 OP 250 PS 637: Performed by: PSYCHIATRY & NEUROLOGY

## 2017-08-08 PROCEDURE — 97150 GROUP THERAPEUTIC PROCEDURES: CPT | Mod: GO

## 2017-08-08 RX ORDER — RISPERIDONE 1 MG/1
1 TABLET ORAL 2 TIMES DAILY
Status: DISCONTINUED | OUTPATIENT
Start: 2017-08-08 | End: 2017-08-09

## 2017-08-08 RX ADMIN — LORAZEPAM 1 MG: 1 TABLET ORAL at 19:37

## 2017-08-08 RX ADMIN — RISPERIDONE 1 MG: 1 TABLET ORAL at 19:37

## 2017-08-08 RX ADMIN — RISPERIDONE 1 MG: 1 TABLET ORAL at 13:30

## 2017-08-08 RX ADMIN — ACETAMINOPHEN 325 MG: 325 TABLET, FILM COATED ORAL at 08:58

## 2017-08-08 ASSESSMENT — ACTIVITIES OF DAILY LIVING (ADL)
ORAL_HYGIENE: INDEPENDENT
GROOMING: INDEPENDENT
ORAL_HYGIENE: PROMPTS
GROOMING: INDEPENDENT
LAUNDRY: WITH SUPERVISION
DRESS: INDEPENDENT
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
## 2017-08-08 NOTE — PROGRESS NOTES
"   08/07/17 1500   General Information   Date Initially Attended OT 08/07/17   Special Considerations see note   Clinical Impression   Affect Fluctuates;Irritable;Labile;Appropriate to situation   Orientation Other (see comments)  (disoriented)   Appearance and ADLs General cleanliness observed in most areas   Attention to Internal Stimuli Needs further assessment   Interaction Skills Other (see comments)  (f;uctuates rapidly)   Ability to Communicate Needs Other (see comments);Intrusive;Demanding  (fluctuates)   Verbal Content Other (see comments)  (fluctuates)   Ability to Maintain Boundaries Other (see comments)  (needs frequent redirection)   Participation Other (see comments)  (inconsistent)   Concentration Concentrates 5-10 minutes   Ability to Concentrate With structure;With refocus;Easily distracted   Follows and Comprehends Directions Follows 1 step with repeats  (50 % of time)   Memory Needs further assessment   Organization Other (see comments)  (poor organization)   Decision Making Impulsive   Planning and Problem Solving Impulsive   Ability to Apply and Learn Concepts Needs further assessment   Frustrations / Stress Tolerance Easily frustrated   Level of Insight No insight   Self Esteem Needs further assessment   Social Supports Needs further assessment   General Observation/Plan   General Observations/Plan See Comments     INITIAL O.T. ASSESSMENT Details:  Attended 1.50 of several possible since admission. Pt has initially seen by other OT staff 8/7/17, see  notes for details. This Assessment is only based upon observations  by writer .50 8/8/17. At start of group was irritable, using profanity when speaking to 1:1 staff..Was informed she could attend group if her behavior was appropriate. Pt agreed and sat down to work on simple coloring task. Poor concentration, easily distracted. Often telling OT to do certain things Ex: held out her comb and told OT \"Put this away\", when directed to do these things " "for herself, distracted to something else. Finished 1 of the simple coloring tasks, and then gave the other to peer and directed her to do it for her. OTR redirected pt to do for herself at which point she began wanting to talk about \"My PTSD. I have trauma.\" began crying. When OTR attempted to redirect, pt became irritable, wanting to talk to specific staff about her PTSD was excused from group at that time. Plan: Pt will be provided with structured activity/tasks, to maximize cognitive function. Will be encouraged to follow 1 step direction, plan ahead, organize approach to task. Redirect as needed. Pt will explore and practice coping skills to reduce and manage stressors in daily life.   "
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
## 2017-08-08 NOTE — PROGRESS NOTES
"Pt out in the milieu most of the shift.  Pleasant and cooperative at the beginning of the shift.  Pt began reporting that a peer was saying inappropriate things to her.  All staff informed to monitor peer closely.  She reported that peer was calling her \"a bitch\" and \"the devil\".  Pt report that he was speaking in [place] so we would not understand him.  This writer spoke with him and he denied making these statements.  This writer requested that both pt and peer keep their distance from each other.  No further problems noted.    Pt denies all psych sx and stated, \"I am okay now.  I don't need any medication.  I am better and I just want to go home\".  Encouraged pt to take her medications x 3, but she continued to refuse.  Pt with increased irritability later in the shift.  Very impatient and upset with this writer when I wasn't able to meet her needs immediately.      Will continue to monitor.  "

## 2017-08-08 NOTE — PROGRESS NOTES
[facility] called [facility] , [phone], spoke with Katarzyna. Advised to fax the commitment paperwork, will give to Hallie who is the on-. Fax: [phone].   Hallie's number, [phone]    [facility] faxed the petition, rodriguez, examiner's statement, exhibit A and notes to above fax number. Called Hallie, left message that the petition has been faxed. Advised that there is no current phone number for pt's parents, will ask evening staff to obtain if parents visit [facility].    Pt father apparently called the unit and was requesting information. [facility] advised him that there is no GINA on file. He insisted that pt had signed one; it is not in the chart. [facility] advised [facility] of this. [facility] believes that pt had rescinded the GINA. [facility] states that pt dad is quite upset and plans to complain to the unit manager.     Dad did leave number: [phone]. [facility] called [place] in [facility], left dad's number. Also advised intake so the face sheet could be updated.

## 2017-08-08 NOTE — PROGRESS NOTES
"Patient wrote a note to this writer: \"Nurse Alice I am not a mistress at all. He is your . In my part I want someone who is single, no children, young, educated like me. I walk the walk and talk the talk. The game is OVER\". Patient signed her name to the note.  "

## 2017-08-09 PROCEDURE — 99233 SBSQ HOSP IP/OBS HIGH 50: CPT | Performed by: NURSE PRACTITIONER

## 2017-08-09 PROCEDURE — 12400003 ZZH R&B MH CRITICAL UMMC

## 2017-08-09 PROCEDURE — 25000132 ZZH RX MED GY IP 250 OP 250 PS 637: Performed by: PSYCHIATRY & NEUROLOGY

## 2017-08-09 PROCEDURE — 99207 ZZC CDG-MDM COMPONENT: MEETS MODERATE - UP CODED: CPT | Performed by: NURSE PRACTITIONER

## 2017-08-09 PROCEDURE — 25000132 ZZH RX MED GY IP 250 OP 250 PS 637: Performed by: NURSE PRACTITIONER

## 2017-08-09 RX ORDER — RISPERIDONE 2 MG/1
2 TABLET ORAL 2 TIMES DAILY
Status: DISCONTINUED | OUTPATIENT
Start: 2017-08-09 | End: 2017-08-15

## 2017-08-09 RX ADMIN — ACETAMINOPHEN 650 MG: 325 TABLET, FILM COATED ORAL at 13:08

## 2017-08-09 RX ADMIN — HALOPERIDOL 5 MG: 5 TABLET ORAL at 13:08

## 2017-08-09 RX ADMIN — HYDROXYZINE HYDROCHLORIDE 50 MG: 25 TABLET ORAL at 13:08

## 2017-08-09 RX ADMIN — BENZTROPINE MESYLATE 1 MG: 1 TABLET ORAL at 14:34

## 2017-08-09 RX ADMIN — LORAZEPAM 2 MG: 1 TABLET ORAL at 14:34

## 2017-08-09 RX ADMIN — RISPERIDONE 2 MG: 2 TABLET ORAL at 08:47

## 2017-08-09 ASSESSMENT — ACTIVITIES OF DAILY LIVING (ADL)
DRESS: INDEPENDENT
GROOMING: INDEPENDENT
ORAL_HYGIENE: INDEPENDENT
DRESS: INDEPENDENT
DRESS: INDEPENDENT
GROOMING: INDEPENDENT
LAUNDRY: WITH SUPERVISION
GROOMING: INDEPENDENT
ORAL_HYGIENE: INDEPENDENT
ORAL_HYGIENE: INDEPENDENT

## 2017-08-09 NOTE — PROGRESS NOTES
[facility] called [facility] [place], [phone]. Left message asking for time and date of pre-petition screen.     Call from Hallie who has done the phone interview and has staffed with the team. They will support the petition.

## 2017-08-09 NOTE — PLAN OF CARE
Problem: Psychotic Symptoms  Goal: Psychotic Symptoms  Signs and symptoms of listed problems will be absent or manageable.   Patient will be reality based  Patient will sleep at least 6 hours at night  Patient will take medications as prescribed  Patient will be able to maintain appropriate boundaries   Outcome: No Change  Pt has been out on the unit all shift. Pt is labile, intrusive, easily frustrated, tense, has poor boundaries and is hesitant to take meds. Pt needs a lot of redirection and prompting to do things. Pt's speech is tangential and pressured. Pt does not attend groups. Gave pt prn's for anxiety/psychosis (see MAR). Will continue to monitor.
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
## 2017-08-10 PROCEDURE — 99232 SBSQ HOSP IP/OBS MODERATE 35: CPT | Performed by: NURSE PRACTITIONER

## 2017-08-10 PROCEDURE — 25000132 ZZH RX MED GY IP 250 OP 250 PS 637: Performed by: PSYCHIATRY & NEUROLOGY

## 2017-08-10 PROCEDURE — 25000132 ZZH RX MED GY IP 250 OP 250 PS 637: Performed by: NURSE PRACTITIONER

## 2017-08-10 PROCEDURE — 12400003 ZZH R&B MH CRITICAL UMMC

## 2017-08-10 RX ORDER — HALOPERIDOL 5 MG/1
5 TABLET ORAL EVERY 4 HOURS PRN
Status: DISCONTINUED | OUTPATIENT
Start: 2017-08-10 | End: 2017-08-29 | Stop reason: HOSPADM

## 2017-08-10 RX ORDER — DIPHENHYDRAMINE HCL 50 MG
50 CAPSULE ORAL EVERY 4 HOURS PRN
Status: DISCONTINUED | OUTPATIENT
Start: 2017-08-10 | End: 2017-08-29 | Stop reason: HOSPADM

## 2017-08-10 RX ORDER — LORAZEPAM 2 MG/ML
1-2 INJECTION INTRAMUSCULAR EVERY 4 HOURS PRN
Status: DISCONTINUED | OUTPATIENT
Start: 2017-08-10 | End: 2017-08-29 | Stop reason: HOSPADM

## 2017-08-10 RX ORDER — OLANZAPINE 10 MG/2ML
10 INJECTION, POWDER, FOR SOLUTION INTRAMUSCULAR 2 TIMES DAILY PRN
Status: DISCONTINUED | OUTPATIENT
Start: 2017-08-10 | End: 2017-08-29 | Stop reason: HOSPADM

## 2017-08-10 RX ORDER — BENZTROPINE MESYLATE 1 MG/1
1 TABLET ORAL 3 TIMES DAILY PRN
Status: DISCONTINUED | OUTPATIENT
Start: 2017-08-10 | End: 2017-08-29 | Stop reason: HOSPADM

## 2017-08-10 RX ORDER — LORAZEPAM 1 MG/1
1-2 TABLET ORAL EVERY 4 HOURS PRN
Status: DISCONTINUED | OUTPATIENT
Start: 2017-08-10 | End: 2017-08-29 | Stop reason: HOSPADM

## 2017-08-10 RX ORDER — LITHIUM CARBONATE 300 MG/1
300 TABLET, FILM COATED, EXTENDED RELEASE ORAL EVERY 12 HOURS SCHEDULED
Status: DISCONTINUED | OUTPATIENT
Start: 2017-08-10 | End: 2017-08-11

## 2017-08-10 RX ORDER — OLANZAPINE 10 MG/1
10 TABLET, ORALLY DISINTEGRATING ORAL 2 TIMES DAILY PRN
Status: DISCONTINUED | OUTPATIENT
Start: 2017-08-10 | End: 2017-08-29 | Stop reason: HOSPADM

## 2017-08-10 RX ORDER — HALOPERIDOL 5 MG/ML
5 INJECTION INTRAMUSCULAR EVERY 4 HOURS PRN
Status: DISCONTINUED | OUTPATIENT
Start: 2017-08-10 | End: 2017-08-29 | Stop reason: HOSPADM

## 2017-08-10 RX ORDER — DIPHENHYDRAMINE HYDROCHLORIDE 50 MG/ML
50 INJECTION INTRAMUSCULAR; INTRAVENOUS EVERY 4 HOURS PRN
Status: DISCONTINUED | OUTPATIENT
Start: 2017-08-10 | End: 2017-08-29 | Stop reason: HOSPADM

## 2017-08-10 RX ADMIN — LORAZEPAM 1 MG: 1 TABLET ORAL at 20:08

## 2017-08-10 RX ADMIN — ACETAMINOPHEN 650 MG: 325 TABLET, FILM COATED ORAL at 20:08

## 2017-08-10 RX ADMIN — RISPERIDONE 2 MG: 2 TABLET ORAL at 08:12

## 2017-08-10 RX ADMIN — HALOPERIDOL 5 MG: 5 TABLET ORAL at 11:51

## 2017-08-10 RX ADMIN — LITHIUM CARBONATE 300 MG: 300 TABLET, EXTENDED RELEASE ORAL at 08:12

## 2017-08-10 RX ADMIN — LITHIUM CARBONATE 300 MG: 300 TABLET, EXTENDED RELEASE ORAL at 20:08

## 2017-08-10 RX ADMIN — RISPERIDONE 2 MG: 2 TABLET ORAL at 20:08

## 2017-08-10 RX ADMIN — LORAZEPAM 2 MG: 1 TABLET ORAL at 11:51

## 2017-08-10 ASSESSMENT — ACTIVITIES OF DAILY LIVING (ADL)
DRESS: STREET CLOTHES;SCRUBS (BEHAVIORAL HEALTH);INDEPENDENT
GROOMING: HANDWASHING;INDEPENDENT
DRESS: INDEPENDENT
LAUNDRY: WITH SUPERVISION
GROOMING: INDEPENDENT
ORAL_HYGIENE: INDEPENDENT
ORAL_HYGIENE: INDEPENDENT
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
## 2017-08-10 NOTE — PROGRESS NOTES
Court order received for pt. Preliminary hearing is 8/14 at 1:30 pm in [facility]. Final hearing scheduled on 8/23 at 9 am. Pt's court appointed  is Ganga Carvalho, [phone]. Court appointed examiner is Dr. Krish Elder.     Copy to pt, copy to chart.

## 2017-08-10 NOTE — PROGRESS NOTES
Pt awake most of the shift.  Pt ate meals, no groups attended, and randomly social.  Pt had numerous verbal altercations with others and staff.  Pt non-reality based concerning commitment.  Pt doesn't believe the court papers are concerning her, that she can leave anytime.  Pt stood by exit doors wanting to leave.  Pt was coaxed back to desk after a long while.  Pt given choice to go to room or seclusion with med. Pt threw court papers at desk area.  Pt escorted to room.  Pt then laid down.  Pt requires lots of redirection, poor boundaries, hostile, belligerent attacks.         08/10/17 1422   Behavioral Health   Hallucinations denies / not responding to hallucinations   Thinking confused;distractable;delusional;paranoid   Orientation person: oriented   Memory confabulation   Insight poor   Judgement impaired   Eye Contact into space;staring;at examiner   Affect tense;irritable   Mood labile   Physical Appearance/Attire attire appropriate to age and situation   Hygiene other (see comment)  (adequate)   Suicidality other (see comments)  (denies)   Elopement Loitering near exit doors;Distress about legal situation (holds for mental health or criminal);Statements about wanting to leave   Activity hyperactive (agitated, impulsive)   Speech circumstantial;flight of ideas   Psychomotor / Gait balanced;steady   Sleep/Rest/Relaxation   Day/Evening Time Hours napping   Number of hours napping 1 hours   Coping/Psychosocial   Verbalized Emotional State anger;disbelief;frustration;powerlessness   Plan Of Care Reviewed With patient   Patient Agreement with Plan of Care disagrees (describe)   Psycho Education   Type of Intervention 1:1 intervention   Response participates with cues/redirection   Activities of Daily Living   Hygiene/Grooming handwashing;independent   Oral Hygiene independent   Dress street clothes;scrubs (behavioral health);independent   Activity   Activity Level of Assistance independent   Behavioral Health  Interventions   Depression monitor need to revise level of observation;assess patient response to medication;monitor need for prn medication;monitor confusion, memory loss, decision making ability and reorient / intervene as needed   Social and Therapeutic Interventions (Depression) encourage effective boundaries with peers
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
## 2017-08-10 NOTE — PROGRESS NOTES
Status Individual Observation continues for this patient, awake most of shift, talking mentioned a Dr. Hartman from [facility], refused to take offered medications,  slept approximately one hour, correction 2 hours.

## 2017-08-11 PROCEDURE — 99232 SBSQ HOSP IP/OBS MODERATE 35: CPT | Performed by: NURSE PRACTITIONER

## 2017-08-11 PROCEDURE — 12400003 ZZH R&B MH CRITICAL UMMC

## 2017-08-11 PROCEDURE — 90853 GROUP PSYCHOTHERAPY: CPT

## 2017-08-11 PROCEDURE — 25000132 ZZH RX MED GY IP 250 OP 250 PS 637: Performed by: PSYCHIATRY & NEUROLOGY

## 2017-08-11 PROCEDURE — 25000132 ZZH RX MED GY IP 250 OP 250 PS 637: Performed by: NURSE PRACTITIONER

## 2017-08-11 RX ORDER — LITHIUM CARBONATE 450 MG
450 TABLET, EXTENDED RELEASE ORAL EVERY 12 HOURS SCHEDULED
Status: DISCONTINUED | OUTPATIENT
Start: 2017-08-11 | End: 2017-08-29 | Stop reason: HOSPADM

## 2017-08-11 RX ADMIN — HALOPERIDOL 5 MG: 5 TABLET ORAL at 16:40

## 2017-08-11 RX ADMIN — LORAZEPAM 1 MG: 1 TABLET ORAL at 22:53

## 2017-08-11 RX ADMIN — LITHIUM CARBONATE 300 MG: 300 TABLET, EXTENDED RELEASE ORAL at 10:37

## 2017-08-11 RX ADMIN — RISPERIDONE 2 MG: 2 TABLET ORAL at 22:53

## 2017-08-11 RX ADMIN — LITHIUM CARBONATE 450 MG: 450 TABLET, EXTENDED RELEASE ORAL at 22:53

## 2017-08-11 RX ADMIN — DIPHENHYDRAMINE HYDROCHLORIDE 50 MG: 50 CAPSULE ORAL at 16:40

## 2017-08-11 RX ADMIN — LORAZEPAM 2 MG: 1 TABLET ORAL at 16:40

## 2017-08-11 RX ADMIN — BENZTROPINE MESYLATE 1 MG: 1 TABLET ORAL at 16:40

## 2017-08-11 RX ADMIN — RISPERIDONE 2 MG: 2 TABLET ORAL at 10:37

## 2017-08-11 ASSESSMENT — ACTIVITIES OF DAILY LIVING (ADL)
GROOMING: INDEPENDENT
DRESS: SCRUBS (BEHAVIORAL HEALTH);INDEPENDENT
ORAL_HYGIENE: INDEPENDENT
DRESS: INDEPENDENT
LAUNDRY: WITH SUPERVISION
GROOMING: INDEPENDENT
ORAL_HYGIENE: INDEPENDENT
LAUNDRY: WITH SUPERVISION

## 2017-08-11 NOTE — PROGRESS NOTES
Placed follow up call to [employer] (clozapine) at [phone] to inquire if faxed paperwork received.  They confirmed receipt and pt's address.  Plan is to send a phlebotomist to pt's home weekly.  Mehrdad will wait on lab results to be faxed again on 8/15/17.

## 2017-08-11 NOTE — PROGRESS NOTES
"   08/10/17 2100   Behavioral Health   Hallucinations denies / not responding to hallucinations   Thinking confused;distractable;poor concentration   Orientation person: oriented;place: oriented   Memory baseline memory   Insight poor   Judgement impaired   Eye Contact at examiner   Affect sad;full range affect   Mood mood is calm   Physical Appearance/Attire neat   Hygiene well groomed   Suicidality other (see comments)  (denies )   Self Injury other (see comment)  (denies )   Elopement Statements about wanting to leave   Activity other (see comment)  (visible in the milieu and socialized with others)   Activities of Daily Living   Hygiene/Grooming independent   Oral Hygiene independent   Dress independent   Laundry with supervision   Room Organization independent       Patient denied SI \" before yes now no.\" Patient denied SIB.  Patient reported feeling sad (6) and confused (6) \" I feel good I feel like this is my home.\"  Patient seems calm, visible in the milieu and socialized with others.  Patient daily goal \" speak with my parents eat dinner.\"  Patient goal after discharge \" I'm gonna be responsible and not depend on my mom, help around the house and take my medications.  Patient is independent with ADL's.  Patient nutritional concerns \" I don't drink black coffe.  Patient wants visitors.  Patient reported unable to stay asleep at night.  "

## 2017-08-11 NOTE — PROGRESS NOTES
[phone]. Left message asking for the contact information for [employer].     Call from [facility]. States won't need testimony Monday. Will need testimony Wednesday 8/23.     VM from [facility]. Phone for court administration for [employer]: [phone]
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
## 2017-08-12 PROCEDURE — 12400003 ZZH R&B MH CRITICAL UMMC

## 2017-08-12 PROCEDURE — 25000132 ZZH RX MED GY IP 250 OP 250 PS 637: Performed by: NURSE PRACTITIONER

## 2017-08-12 PROCEDURE — 25000132 ZZH RX MED GY IP 250 OP 250 PS 637: Performed by: PSYCHIATRY & NEUROLOGY

## 2017-08-12 RX ADMIN — LITHIUM CARBONATE 450 MG: 450 TABLET, EXTENDED RELEASE ORAL at 21:50

## 2017-08-12 RX ADMIN — LORAZEPAM 1 MG: 1 TABLET ORAL at 21:51

## 2017-08-12 RX ADMIN — ACETAMINOPHEN 650 MG: 325 TABLET, FILM COATED ORAL at 14:05

## 2017-08-12 RX ADMIN — LITHIUM CARBONATE 450 MG: 450 TABLET, EXTENDED RELEASE ORAL at 09:17

## 2017-08-12 RX ADMIN — RISPERIDONE 2 MG: 2 TABLET ORAL at 21:50

## 2017-08-12 RX ADMIN — RISPERIDONE 2 MG: 2 TABLET ORAL at 09:17

## 2017-08-12 ASSESSMENT — ACTIVITIES OF DAILY LIVING (ADL)
ORAL_HYGIENE: INDEPENDENT
GROOMING: INDEPENDENT
GROOMING: INDEPENDENT
LAUNDRY: WITH SUPERVISION
DRESS: INDEPENDENT
DRESS: INDEPENDENT
ORAL_HYGIENE: INDEPENDENT

## 2017-08-12 NOTE — PROGRESS NOTES
Pt on SIO throughout night. Pt woke periodically through night requesting milk. Pt otherwise remained in bed, calm and quiet, with no behavioral concerns and no complaints. Pt obtained five hours of sleep on night shift.
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
## 2017-08-13 PROCEDURE — 90853 GROUP PSYCHOTHERAPY: CPT

## 2017-08-13 PROCEDURE — 25000132 ZZH RX MED GY IP 250 OP 250 PS 637: Performed by: NURSE PRACTITIONER

## 2017-08-13 PROCEDURE — 25000132 ZZH RX MED GY IP 250 OP 250 PS 637: Performed by: PSYCHIATRY & NEUROLOGY

## 2017-08-13 PROCEDURE — 12400003 ZZH R&B MH CRITICAL UMMC

## 2017-08-13 RX ADMIN — LITHIUM CARBONATE 450 MG: 450 TABLET, EXTENDED RELEASE ORAL at 22:30

## 2017-08-13 RX ADMIN — ACETAMINOPHEN 650 MG: 325 TABLET, FILM COATED ORAL at 02:36

## 2017-08-13 RX ADMIN — RISPERIDONE 2 MG: 2 TABLET ORAL at 22:31

## 2017-08-13 RX ADMIN — ACETAMINOPHEN 650 MG: 325 TABLET, FILM COATED ORAL at 14:42

## 2017-08-13 RX ADMIN — LORAZEPAM 1 MG: 1 TABLET ORAL at 22:30

## 2017-08-13 RX ADMIN — RISPERIDONE 2 MG: 2 TABLET ORAL at 08:20

## 2017-08-13 RX ADMIN — LITHIUM CARBONATE 450 MG: 450 TABLET, EXTENDED RELEASE ORAL at 08:20

## 2017-08-13 ASSESSMENT — ACTIVITIES OF DAILY LIVING (ADL)
ORAL_HYGIENE: INDEPENDENT
DRESS: INDEPENDENT
TOILETING: 0-->INDEPENDENT
SWALLOWING: 0-->SWALLOWS FOODS/LIQUIDS WITHOUT DIFFICULTY
DRESS: 0-->INDEPENDENT
LAUNDRY: WITH SUPERVISION
FALL_HISTORY_WITHIN_LAST_SIX_MONTHS: YES
BATHING: 0-->INDEPENDENT
NUMBER_OF_TIMES_PATIENT_HAS_FALLEN_WITHIN_LAST_SIX_MONTHS: 3
GROOMING: INDEPENDENT
RETIRED_COMMUNICATION: 0-->UNDERSTANDS/COMMUNICATES WITHOUT DIFFICULTY
RETIRED_EATING: 0-->INDEPENDENT
TRANSFERRING: 0-->INDEPENDENT
AMBULATION: 0-->INDEPENDENT
COGNITION: 0 - NO COGNITION ISSUES REPORTED

## 2017-08-13 NOTE — PLAN OF CARE
Problem: General Plan of Care (Inpatient Behavioral)  Goal: Individualization/Patient Specific Goal (IP Behavioral)  The patient and/or their representative will achieve their patient-specific goals related to the plan of care.    The patient-specific goals include:     Illness Management Recovery model: Objectives  Patient will identify reason(s) for hospitalization from their perspective.  Patient will identify a minimum of three goals for discharge.  Patient will identify a minimum of three triggers that may increase their symptoms.  Patient will identify a minimum of three coping skills they can do to stay well.   Patient will identify their support system to demonstrate readiness for discharge.    Illness Management & Recovery assists patient to develop relapse prevention as  patient identifies triggers for relapse.  patient identifies a general wellness strategy.  patient identifies the warning signs that they are in danger of relapse.  patient identifies someone they count on to get feedback .  patient identifies ways to take action when in danger of relapse.  patient identifies way to cope with stress or other symptoms.   patient participates in self-reflection.  Outcome: Improving  Illness Management Recovery model:  Self-Reflection & Planning.     Assessed patient's progress completing forms related to Illness Management Recovery (including Personal Plan of Care, Adult Coping Plan, and My Support and Coping Plan) and assisted as needed.     Encouraged patient to continue to consider triggers, wellness strategies, early warning signs, feedback from others, actions to take to prevent relapse, and coping strategies as part of a plan to remain well after leaving the hospital.

## 2017-08-13 NOTE — PROGRESS NOTES
"   08/12/17 9014   Significant Event   Significant Event Other (see comments)  (shift summary)     Pt was present in the milieu, social with peers and staff.  While in the milieu, pt watched TV and socialized with peers.  Denies SI, SIB, hallucinations and all mental health symptoms.  Pt stated that, \"she is feeling great, but right hand has some pain.\"    "

## 2017-08-13 NOTE — PLAN OF CARE
"Problem: Psychotic Symptoms  Goal: Psychotic Symptoms  Signs and symptoms of listed problems will be absent or manageable.     Patient will sleep at least 6 hours at night 8/13/17 Completed as hours reported by NOC are greater than 6.  Patient will take medications as prescribed  Patient will be able to maintain appropriate boundaries   Patient will be reality based  Outcome: Improving  Patient remains on SIO r/t being impulsive with poor boundaries. Patient is demanding, intrusive and restless. No angry outbursts this shift. Patient took scheduled medications on 2nd attempt (refused 1st attempt). Pa     Patient remarked \"I thought I would be here 3 days and have been here for 14 days-I just want to go home.\"     Patient reported \"I slept good\" (reported to have slept 7 hours) and is eating 100%. Patient able to attend group today (previously has been asked to leave when unable to redirect).     /83  Pulse 100  Temp 97.5  F (36.4  C) (Oral)  Resp 16  Wt 70.3 kg (155 lb)  LMP  (LMP Unknown)  BMI 23.57 kg/m2     Patient denies SI/SIB.     Nursing will continue to monitor.      "

## 2017-08-14 PROCEDURE — 25000132 ZZH RX MED GY IP 250 OP 250 PS 637: Performed by: NURSE PRACTITIONER

## 2017-08-14 PROCEDURE — 12400003 ZZH R&B MH CRITICAL UMMC

## 2017-08-14 PROCEDURE — 25000132 ZZH RX MED GY IP 250 OP 250 PS 637: Performed by: PSYCHIATRY & NEUROLOGY

## 2017-08-14 PROCEDURE — 99232 SBSQ HOSP IP/OBS MODERATE 35: CPT | Performed by: NURSE PRACTITIONER

## 2017-08-14 RX ORDER — ANALGESIC BALM 1.74; 4.06 G/29G; G/29G
OINTMENT TOPICAL EVERY 4 HOURS PRN
Status: DISCONTINUED | OUTPATIENT
Start: 2017-08-14 | End: 2017-08-29 | Stop reason: HOSPADM

## 2017-08-14 RX ADMIN — RISPERIDONE 2 MG: 2 TABLET ORAL at 09:12

## 2017-08-14 RX ADMIN — LITHIUM CARBONATE 450 MG: 450 TABLET, EXTENDED RELEASE ORAL at 21:08

## 2017-08-14 RX ADMIN — RISPERIDONE 2 MG: 2 TABLET ORAL at 21:09

## 2017-08-14 RX ADMIN — LITHIUM CARBONATE 450 MG: 450 TABLET, EXTENDED RELEASE ORAL at 09:12

## 2017-08-14 RX ADMIN — LORAZEPAM 1 MG: 1 TABLET ORAL at 21:09

## 2017-08-14 ASSESSMENT — ACTIVITIES OF DAILY LIVING (ADL)
GROOMING: HANDWASHING;INDEPENDENT
GROOMING: INDEPENDENT
LAUNDRY: WITH SUPERVISION
ORAL_HYGIENE: INDEPENDENT
ORAL_HYGIENE: INDEPENDENT
DRESS: STREET CLOTHES;INDEPENDENT

## 2017-08-14 NOTE — PROGRESS NOTES
Call from pt's dad, Deandre, [phone]. There is now a GINA on file.     [facility] called back. Discussed pt progress.
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
## 2017-08-14 NOTE — PROGRESS NOTES
Pt awake entire shift.  Pt still on 1:1 to be reevaluated tomorrow.  Pt went to court at 11:40 a.m.  Pt blaming [place] for her current problems.  Pt insight is poor regarding current situation.  Pt is less agitated and intrusive.        08/14/17 1446   Behavioral Health   Hallucinations denies / not responding to hallucinations   Thinking distractable;poor concentration   Orientation person: oriented;place: oriented;date: oriented;time: oriented   Memory baseline memory   Insight poor   Judgement impaired   Eye Contact at examiner   Affect full range affect   Mood mood is calm   Physical Appearance/Attire attire appropriate to age and situation   Hygiene well groomed   Suicidality other (see comments)  (denies)   Activity other (see comment)  (in the milieu off and on)   Speech clear;coherent   Psychomotor / Gait balanced;steady   Sleep/Rest/Relaxation   Day/Evening Time Hours up all shift   Coping/Psychosocial   Verbalized Emotional State acceptance;disbelief;frustration;powerlessness   Plan Of Care Reviewed With patient   Psycho Education   Type of Intervention 1:1 intervention   Response participates with cues/redirection   Activities of Daily Living   Hygiene/Grooming handwashing;independent   Oral Hygiene independent   Dress street clothes;independent   Laundry with supervision   Room Organization independent   Activity   Activity Level of Assistance independent   Behavioral Health Interventions   Depression provide emotional support;assist with developing and utilizing healthy coping strategies;assess patient response to medication   Social and Therapeutic Interventions (Depression) encourage socialization with peers;encourage participation in therapeutic groups and milieu activities
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
## 2017-08-15 LAB — LITHIUM SERPL-SCNC: 0.8 MMOL/L (ref 0.6–1.2)

## 2017-08-15 PROCEDURE — 12400007 ZZH R&B MH INTERMEDIATE UMMC

## 2017-08-15 PROCEDURE — 90853 GROUP PSYCHOTHERAPY: CPT

## 2017-08-15 PROCEDURE — 25000132 ZZH RX MED GY IP 250 OP 250 PS 637: Performed by: NURSE PRACTITIONER

## 2017-08-15 PROCEDURE — 99232 SBSQ HOSP IP/OBS MODERATE 35: CPT | Performed by: NURSE PRACTITIONER

## 2017-08-15 PROCEDURE — 25000132 ZZH RX MED GY IP 250 OP 250 PS 637: Performed by: PSYCHIATRY & NEUROLOGY

## 2017-08-15 PROCEDURE — 80178 ASSAY OF LITHIUM: CPT | Performed by: NURSE PRACTITIONER

## 2017-08-15 PROCEDURE — 36415 COLL VENOUS BLD VENIPUNCTURE: CPT | Performed by: NURSE PRACTITIONER

## 2017-08-15 RX ORDER — RISPERIDONE 1 MG/1
1 TABLET ORAL EVERY MORNING
Status: DISCONTINUED | OUTPATIENT
Start: 2017-08-16 | End: 2017-08-16

## 2017-08-15 RX ORDER — RISPERIDONE 3 MG/1
3 TABLET ORAL AT BEDTIME
Status: DISCONTINUED | OUTPATIENT
Start: 2017-08-15 | End: 2017-08-16

## 2017-08-15 RX ADMIN — LITHIUM CARBONATE 450 MG: 450 TABLET, EXTENDED RELEASE ORAL at 08:07

## 2017-08-15 RX ADMIN — TRAZODONE HYDROCHLORIDE 50 MG: 50 TABLET ORAL at 02:12

## 2017-08-15 RX ADMIN — RISPERIDONE 3 MG: 3 TABLET ORAL at 21:09

## 2017-08-15 RX ADMIN — HYDROXYZINE HYDROCHLORIDE 50 MG: 25 TABLET ORAL at 02:12

## 2017-08-15 RX ADMIN — ACETAMINOPHEN 650 MG: 325 TABLET, FILM COATED ORAL at 08:07

## 2017-08-15 RX ADMIN — LITHIUM CARBONATE 450 MG: 450 TABLET, EXTENDED RELEASE ORAL at 21:08

## 2017-08-15 RX ADMIN — LORAZEPAM 1 MG: 1 TABLET ORAL at 21:08

## 2017-08-15 ASSESSMENT — ACTIVITIES OF DAILY LIVING (ADL)
ORAL_HYGIENE: INDEPENDENT
LAUNDRY: WITH SUPERVISION
GROOMING: PROMPTS
DRESS: STREET CLOTHES
GROOMING: INDEPENDENT
ORAL_HYGIENE: PROMPTS

## 2017-08-15 NOTE — PROGRESS NOTES
Patient remained on SIO throughout the night, receiving 3.5 hours of sleep.  Patient up intermittently, needing redirection when perseverating on wanting a sling for her arm, redirecting her to address her provider during the day regarding this request.  Patient also requested PRN medication for sleep, accepted hydroxyzine and trazadone 50mg each with minimal effect.

## 2017-08-15 NOTE — PROGRESS NOTES
BEN from [facility] in [facility]. Contact information for Monday [employer] [address] is [phone] which is court administration.

## 2017-08-15 NOTE — PLAN OF CARE
Problem: Psychotic Symptoms  Goal: Psychotic Symptoms  Signs and symptoms of listed problems will be absent or manageable.     Patient will sleep at least 6 hours at night 8/13/17 Completed as hours reported by NOC are greater than 6.  Patient will take medications as prescribed  Patient will be able to maintain appropriate boundaries   Patient will be reality based      RN 48 hour assessment:     Patient was discontinued from her SIO except for night shift. She has maintained appropriate boundaries since the SIO was d/c'd a few hours ago. Patient has complied with her medications this shift. Risperdal was changed to only at bedtime. She did take the Lithium as prescribed this shift. She complained of a muscle hurting in her right shoulder. Patient was offered Tylenol and an ice pack, which she declined.
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
## 2017-08-15 NOTE — PLAN OF CARE
Problem: Psychotic Symptoms  Goal: Psychotic Symptoms  Signs and symptoms of listed problems will be absent or manageable.     Patient will sleep at least 6 hours at night 8/13/17 Completed as hours reported by NOC are greater than 6.  Patient will take medications as prescribed  Patient will be able to maintain appropriate boundaries   Patient will be reality based      Attended OT group focused on Stress Management. She offered multiple comments and on several occasions, was talking at the same time as others. Affect was bright. She seemed interested in being actively involved. Was easily redirected when interrupting others. Comments were in context at times and tangential at times.

## 2017-08-15 NOTE — PROGRESS NOTES
from Hallie in [facility], [phone].     [place] Hearing is 8/21 at 1:15. Will need I-TV and doctor testimony. Court administration contact is [phone]    [facility] called back. Left message asking if pt would be transported for the court hearing or if she will also be I-TV. Also asked to clarify the testimony on the 23rd at 9 am.     BEN from [place] from [facility], [phone]. Wants to make sure that the doctor is available for the 8/21 hearing. Also wants to know who to speak to for the I-TV appearance.     [facility] called back. Both hearings will be on 8/21. [facility] gave information about change in providers for the hearing. Gave contact information for hardeep BELTRAN for the I-TV set up.

## 2017-08-16 PROCEDURE — 99232 SBSQ HOSP IP/OBS MODERATE 35: CPT | Performed by: NURSE PRACTITIONER

## 2017-08-16 PROCEDURE — H2032 ACTIVITY THERAPY, PER 15 MIN: HCPCS

## 2017-08-16 PROCEDURE — 25000132 ZZH RX MED GY IP 250 OP 250 PS 637: Performed by: PSYCHIATRY & NEUROLOGY

## 2017-08-16 PROCEDURE — 25000132 ZZH RX MED GY IP 250 OP 250 PS 637: Performed by: NURSE PRACTITIONER

## 2017-08-16 PROCEDURE — 90853 GROUP PSYCHOTHERAPY: CPT

## 2017-08-16 PROCEDURE — 12400007 ZZH R&B MH INTERMEDIATE UMMC

## 2017-08-16 RX ORDER — RISPERIDONE 2 MG/1
4 TABLET ORAL AT BEDTIME
Status: DISCONTINUED | OUTPATIENT
Start: 2017-08-16 | End: 2017-08-29 | Stop reason: HOSPADM

## 2017-08-16 RX ADMIN — RISPERIDONE 4 MG: 2 TABLET ORAL at 21:36

## 2017-08-16 RX ADMIN — LORAZEPAM 1 MG: 1 TABLET ORAL at 20:37

## 2017-08-16 RX ADMIN — LITHIUM CARBONATE 450 MG: 450 TABLET, EXTENDED RELEASE ORAL at 20:37

## 2017-08-16 RX ADMIN — TRAZODONE HYDROCHLORIDE 50 MG: 50 TABLET ORAL at 20:37

## 2017-08-16 RX ADMIN — LITHIUM CARBONATE 450 MG: 450 TABLET, EXTENDED RELEASE ORAL at 09:14

## 2017-08-16 RX ADMIN — OLANZAPINE 10 MG: 10 TABLET, ORALLY DISINTEGRATING ORAL at 01:40

## 2017-08-16 NOTE — PROGRESS NOTES
08/16/17 [facility]   Behavioral Health   Hallucinations denies / not responding to hallucinations   Thinking poor concentration;distractable;confused   Orientation person: oriented;place: oriented;date: oriented;time: oriented   Memory baseline memory   Insight poor   Judgement impaired   Eye Contact at examiner   Affect full range affect   Mood anxious   Physical Appearance/Attire untidy   Hygiene neglected grooming - unclean body, hair, teeth   Suicidality (denied)   Self Injury other (see comment)  (denied)   Activity restless;other (see comment)  (intrusive)   Speech rambling   Psychomotor / Gait balanced;steady   Coping/Psychosocial   Verbalized Emotional State anxiety;hopefulness   Plan Of Care Reviewed With patient   Psycho Education   Type of Intervention structured groups   Response participates with cues/redirection   Hours 0.5   Behavioral Health Interventions   Depression maintain safety precautions;maintain safe secure environment   Social and Therapeutic Interventions (Depression) encourage socialization with peers;encourage participation in therapeutic groups and milieu activities;encourage effective boundaries with peers     Pt attended groups and meals. Pt is intrusive at times and needs redirection. Pt is social and cooperative on milieu. Pt is easily distracted and and confused at times. Pts anticipates a visit from her family in the afternoon. No concerns or issues to report otherwise.
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
## 2017-08-17 PROCEDURE — 12400007 ZZH R&B MH INTERMEDIATE UMMC

## 2017-08-17 PROCEDURE — 25000132 ZZH RX MED GY IP 250 OP 250 PS 637: Performed by: PSYCHIATRY & NEUROLOGY

## 2017-08-17 PROCEDURE — 99207 ZZC CDG-CODE INCORRECT PER BILLING BASED ON TIME: CPT | Performed by: PSYCHIATRY & NEUROLOGY

## 2017-08-17 PROCEDURE — 97150 GROUP THERAPEUTIC PROCEDURES: CPT | Mod: GO

## 2017-08-17 PROCEDURE — 99233 SBSQ HOSP IP/OBS HIGH 50: CPT | Performed by: PSYCHIATRY & NEUROLOGY

## 2017-08-17 PROCEDURE — 25000132 ZZH RX MED GY IP 250 OP 250 PS 637: Performed by: NURSE PRACTITIONER

## 2017-08-17 RX ORDER — LANOLIN ALCOHOL/MO/W.PET/CERES
3 CREAM (GRAM) TOPICAL AT BEDTIME
Status: DISCONTINUED | OUTPATIENT
Start: 2017-08-17 | End: 2017-08-29 | Stop reason: HOSPADM

## 2017-08-17 RX ADMIN — LORAZEPAM 1 MG: 1 TABLET ORAL at 21:33

## 2017-08-17 RX ADMIN — RISPERIDONE 4 MG: 2 TABLET ORAL at 21:33

## 2017-08-17 RX ADMIN — LITHIUM CARBONATE 450 MG: 450 TABLET, EXTENDED RELEASE ORAL at 08:52

## 2017-08-17 RX ADMIN — LITHIUM CARBONATE 450 MG: 450 TABLET, EXTENDED RELEASE ORAL at 21:32

## 2017-08-17 RX ADMIN — Medication 3 MG: at 21:33

## 2017-08-17 ASSESSMENT — ACTIVITIES OF DAILY LIVING (ADL)
LAUNDRY: WITH SUPERVISION
ORAL_HYGIENE: INDEPENDENT
GROOMING: INDEPENDENT
GROOMING: INDEPENDENT
ORAL_HYGIENE: INDEPENDENT
LAUNDRY: WITH SUPERVISION
DRESS: INDEPENDENT
ORAL_HYGIENE: INDEPENDENT
GROOMING: INDEPENDENT
DRESS: INDEPENDENT
LAUNDRY: UNABLE TO COMPLETE
DRESS: INDEPENDENT

## 2017-08-17 NOTE — PLAN OF CARE
Problem: Psychotic Symptoms  Goal: Social and Therapeutic (Psychotic Symptoms)  Signs and symptoms of listed problems will be absent or manageable.      Attended 1.50 of 2.0 possible O.T. groups. More alert than last observation by writer. Alert, speech clear, able to make needs known. Appropriate topics and behavior. Concentrated quietly on simple task 30-40 minutes.

## 2017-08-17 NOTE — PLAN OF CARE
Problem: Psychotic Symptoms  Goal: Psychotic Symptoms  Signs and symptoms of listed problems will be absent or manageable.     Patient will sleep at least 6 hours at night 8/13/17 Completed as hours reported by NOC are greater than 6.  Patient will take medications as prescribed  Patient will be able to maintain appropriate boundaries   Patient will be reality based   Outcome: Improving  Pt.was in and out of the milieu. Pt.appeared very sociable but intrusive. Needed multiple redirections about intruding into other pts' affairs. She denied si/sib/hallucinations. Denied any medication adverse side effects. Will continue to monitor.

## 2017-08-17 NOTE — PLAN OF CARE
Problem: Psychotic Symptoms  Goal: Psychotic Symptoms  Signs and symptoms of listed problems will be absent or manageable.     Patient will sleep at least 6 hours at night 8/13/17 Completed as hours reported by NOC are greater than 6.  Patient will take medications as prescribed  Patient will be able to maintain appropriate boundaries   Patient will be reality based   Outcome: Therapy, progress toward functional goals as expected  Patient has been calm, cooperative and following direction. She denies SI/SIB, hallucinations and racing thoughts. Patient's insight is poor with poor concentration. Father called regarding patient's poor sleep and requested if staff can minimize interrupting patient during sleep. Evening shift notified to pass report to night shift.
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
## 2017-08-18 PROCEDURE — 25000132 ZZH RX MED GY IP 250 OP 250 PS 637: Performed by: PSYCHIATRY & NEUROLOGY

## 2017-08-18 PROCEDURE — 25000132 ZZH RX MED GY IP 250 OP 250 PS 637: Performed by: NURSE PRACTITIONER

## 2017-08-18 PROCEDURE — 12400007 ZZH R&B MH INTERMEDIATE UMMC

## 2017-08-18 PROCEDURE — 99231 SBSQ HOSP IP/OBS SF/LOW 25: CPT | Performed by: PSYCHIATRY & NEUROLOGY

## 2017-08-18 PROCEDURE — 97150 GROUP THERAPEUTIC PROCEDURES: CPT | Mod: GO

## 2017-08-18 RX ADMIN — RISPERIDONE 4 MG: 2 TABLET ORAL at 22:07

## 2017-08-18 RX ADMIN — LORAZEPAM 1 MG: 1 TABLET ORAL at 20:43

## 2017-08-18 RX ADMIN — LITHIUM CARBONATE 450 MG: 450 TABLET, EXTENDED RELEASE ORAL at 09:17

## 2017-08-18 RX ADMIN — LITHIUM CARBONATE 450 MG: 450 TABLET, EXTENDED RELEASE ORAL at 20:43

## 2017-08-18 RX ADMIN — Medication 3 MG: at 22:07

## 2017-08-18 ASSESSMENT — ACTIVITIES OF DAILY LIVING (ADL)
LAUNDRY: WITH SUPERVISION
ORAL_HYGIENE: INDEPENDENT
GROOMING: INDEPENDENT
ORAL_HYGIENE: INDEPENDENT
DRESS: STREET CLOTHES;INDEPENDENT
GROOMING: HANDWASHING;SHOWER;INDEPENDENT
DRESS: INDEPENDENT

## 2017-08-18 NOTE — PROGRESS NOTES
Pt awake entire shift.  Pt ate meals, attended group.  Pt was also seen by 2 court examiners today.  Pt med compliant.  Pt mood less labile, less intrusive, and less irritable.  Pt cooperative and pleasant.         08/18/17 1444   Behavioral Health   Hallucinations denies / not responding to hallucinations   Thinking other (see comment)  (improved)   Orientation person: oriented;place: oriented   Memory baseline memory   Insight other (see comment)  (improved)   Judgement impaired   Eye Contact at examiner   Affect full range affect   Mood mood is calm   Physical Appearance/Attire attire appropriate to age and situation   Hygiene well groomed   Suicidality other (see comments)  (denies)   Speech flight of ideas;tangential;clear   Psychomotor / Gait balanced;steady   Sleep/Rest/Relaxation   Day/Evening Time Hours up all shift   Coping/Psychosocial   Verbalized Emotional State anxiety;hopefulness;relief   Plan Of Care Reviewed With patient   Psycho Education   Type of Intervention 1:1 intervention   Response participates, initiates socially appropriate   Activities of Daily Living   Hygiene/Grooming handwashing;shower;independent   Oral Hygiene independent   Dress street clothes;independent   Activity   Activity Level of Assistance independent   Behavioral Health Interventions   Depression provide emotional support;assist with developing and utilizing healthy coping strategies;assess patient response to medication   Social and Therapeutic Interventions (Depression) encourage socialization with peers;encourage participation in therapeutic groups and milieu activities

## 2017-08-18 NOTE — PLAN OF CARE
"Problem: Psychotic Symptoms  Goal: Social and Therapeutic (Psychotic Symptoms)  Signs and symptoms of listed problems will be absent or manageable.      Attended 2.0   of 3.0 possible O.T. groups. Selected more complex task than coloring today. Needed prompts to read direction and did follow them.  Pt asked why she was in court process, accepted explanation, replied \"I am back to me now.\"       "

## 2017-08-18 NOTE — PROGRESS NOTES
"     08/17/17 2124   Behavioral Health   Hallucinations denies / not responding to hallucinations   Thinking distractable;poor concentration   Orientation person: oriented;place: oriented;date: oriented;time: oriented   Memory baseline memory   Insight denial of illness;poor   Judgement impaired   Eye Contact at examiner   Affect full range affect   Mood labile   Physical Appearance/Attire untidy   Hygiene neglected grooming - unclean body, hair, teeth   Suicidality other (see comments)  (Pt denies.)   Self Injury other (see comment)  (Pt denies.)   Activity other (see comment)  (Pt visible all shift, social with others.)   Speech flight of ideas   Medication Sensitivity no stated side effects;no observed side effects   Psychomotor / Gait balanced;steady   Psycho Education   Type of Intervention 1:1 intervention   Response participates, initiates socially appropriate   Hours 0.5   Treatment Detail (1:1 check in.)   Activities of Daily Living   Hygiene/Grooming independent   Oral Hygiene independent   Dress independent   Laundry unable to complete   Room Organization independent   Activity   Activity Level of Assistance independent   Behavioral Health Interventions   Depression maintain safety precautions   Social and Therapeutic Interventions (Depression) encourage effective boundaries with peers   Psychotic Symptoms maintain safety precautions   Social and Therapeutic Interventions (Psychotic Symptoms) encourage effective boundaries with peers     Pt visible in milieu all shift, poor boundaries with other patients needed lots of redirections .  Pt goal was to \"act normal and figure out discharge\".  Pt denies SI/SIB and all mental health symptoms, states she does not want to take any medications. Pt believes  her medications are hurting her and does not want to take them.   "

## 2017-08-19 PROCEDURE — 12400007 ZZH R&B MH INTERMEDIATE UMMC

## 2017-08-19 PROCEDURE — 25000132 ZZH RX MED GY IP 250 OP 250 PS 637: Performed by: PSYCHIATRY & NEUROLOGY

## 2017-08-19 PROCEDURE — 25000132 ZZH RX MED GY IP 250 OP 250 PS 637: Performed by: NURSE PRACTITIONER

## 2017-08-19 RX ADMIN — LORAZEPAM 1 MG: 1 TABLET ORAL at 21:12

## 2017-08-19 RX ADMIN — LITHIUM CARBONATE 450 MG: 450 TABLET, EXTENDED RELEASE ORAL at 09:09

## 2017-08-19 RX ADMIN — Medication 3 MG: at 22:13

## 2017-08-19 RX ADMIN — LITHIUM CARBONATE 450 MG: 450 TABLET, EXTENDED RELEASE ORAL at 21:12

## 2017-08-19 RX ADMIN — RISPERIDONE 4 MG: 2 TABLET ORAL at 22:13

## 2017-08-19 ASSESSMENT — ACTIVITIES OF DAILY LIVING (ADL)
GROOMING: INDEPENDENT
DRESS: STREET CLOTHES
DRESS: INDEPENDENT
LAUNDRY: WITH SUPERVISION
ORAL_HYGIENE: INDEPENDENT
GROOMING: INDEPENDENT
LAUNDRY: WITH SUPERVISION
ORAL_HYGIENE: INDEPENDENT
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
## 2017-08-19 NOTE — PLAN OF CARE
Problem: Psychotic Symptoms  Goal: Psychotic Symptoms  Signs and symptoms of listed problems will be absent or manageable.     Patient will sleep at least 6 hours at night 8/13/17 Completed as hours reported by NOC are greater than 6.  Patient will take medications as prescribed  Patient will be able to maintain appropriate boundaries   Patient will be reality based      RN 48 hour assessment:     Patient was calm this shift and subdued. She said she took a nap this afternoon. She has not been intrusive to other patients this shift. She took her medications as ordered. Patient has been able to maintain good boundaries with others so far today.

## 2017-08-20 PROCEDURE — 25000132 ZZH RX MED GY IP 250 OP 250 PS 637: Performed by: PSYCHIATRY & NEUROLOGY

## 2017-08-20 PROCEDURE — 25000132 ZZH RX MED GY IP 250 OP 250 PS 637: Performed by: NURSE PRACTITIONER

## 2017-08-20 PROCEDURE — 12400007 ZZH R&B MH INTERMEDIATE UMMC

## 2017-08-20 RX ADMIN — RISPERIDONE 4 MG: 2 TABLET ORAL at 22:10

## 2017-08-20 RX ADMIN — LORAZEPAM 1 MG: 1 TABLET ORAL at 21:37

## 2017-08-20 RX ADMIN — Medication 3 MG: at 22:11

## 2017-08-20 RX ADMIN — LITHIUM CARBONATE 450 MG: 450 TABLET, EXTENDED RELEASE ORAL at 21:36

## 2017-08-20 RX ADMIN — LITHIUM CARBONATE 450 MG: 450 TABLET, EXTENDED RELEASE ORAL at 09:05

## 2017-08-20 ASSESSMENT — ACTIVITIES OF DAILY LIVING (ADL)
GROOMING: INDEPENDENT
GROOMING: INDEPENDENT
ORAL_HYGIENE: INDEPENDENT
DRESS: STREET CLOTHES
LAUNDRY: WITH SUPERVISION
ORAL_HYGIENE: INDEPENDENT
LAUNDRY: WITH SUPERVISION
DRESS: INDEPENDENT

## 2017-08-20 NOTE — PROGRESS NOTES
Pt was calm and controlled, on approach social with peers, behavior was calm and controlled. States she is doing well, denies SI, SIB, HI and cooperative.

## 2017-08-21 PROCEDURE — 99231 SBSQ HOSP IP/OBS SF/LOW 25: CPT | Performed by: PSYCHIATRY & NEUROLOGY

## 2017-08-21 PROCEDURE — 25000132 ZZH RX MED GY IP 250 OP 250 PS 637: Performed by: PSYCHIATRY & NEUROLOGY

## 2017-08-21 PROCEDURE — 12400007 ZZH R&B MH INTERMEDIATE UMMC

## 2017-08-21 PROCEDURE — 25000132 ZZH RX MED GY IP 250 OP 250 PS 637: Performed by: NURSE PRACTITIONER

## 2017-08-21 PROCEDURE — 90853 GROUP PSYCHOTHERAPY: CPT

## 2017-08-21 RX ADMIN — RISPERIDONE 4 MG: 2 TABLET ORAL at 21:26

## 2017-08-21 RX ADMIN — Medication 3 MG: at 21:26

## 2017-08-21 RX ADMIN — LITHIUM CARBONATE 450 MG: 450 TABLET, EXTENDED RELEASE ORAL at 21:25

## 2017-08-21 RX ADMIN — LITHIUM CARBONATE 450 MG: 450 TABLET, EXTENDED RELEASE ORAL at 08:20

## 2017-08-21 RX ADMIN — LORAZEPAM 1 MG: 1 TABLET ORAL at 21:26

## 2017-08-21 RX ADMIN — ACETAMINOPHEN 650 MG: 325 TABLET, FILM COATED ORAL at 08:20

## 2017-08-21 ASSESSMENT — ACTIVITIES OF DAILY LIVING (ADL)
GROOMING: INDEPENDENT
ORAL_HYGIENE: INDEPENDENT
DRESS: INDEPENDENT

## 2017-08-21 NOTE — PROGRESS NOTES
[facility] called Hallie in [facility], [phone]. Discussed injections. She will check other options but states that [facility] team does injections. Will also check on some day programs.     VM from pt's father, Jennifer. [phone]. CTC called back. Confirmed that pt will go home to live with him and mom.

## 2017-08-21 NOTE — PROGRESS NOTES
"[facility], [place]   Psychiatric Progress Note        Interim History:   Reason for Hospitalization: Christine is a 22 year old female with history of Bipolar Disorder with psychosis who was admitted on 8/2 [place] the [place] for worsening agitation and psychotic symptoms. Of note, she is in a court hold, and is currently in the process of being committed.     Subjective: \"My sleep has improved\"     As per today's interview: Patient was seen in the common are of the milieu, and was quite calm, pleasant and cooperative in speaking with me. She wears a head scarf today. She had questions about her commitment court hearing today. Denies any low moods today, and has been trying to use her coping skills to stay calm. Denies any SI/HI/AH/VH.          Medications:       melatonin  3 mg Oral At Bedtime     risperiDONE  4 mg Oral At Bedtime     lithium  450 mg Oral Q12H ANA     LORazepam  1 mg Oral At Bedtime          Allergies:   No Known Allergies       Labs:   No results found for this or any previous visit (from the past 48 hour(s)).       Psychiatric Examination:   /82  Pulse 100  Temp 98.5  F (36.9  C) (Oral)  Resp 16  Wt 71.4 kg (157 lb 6.4 oz)  LMP  (LMP Unknown)  BMI 23.93 kg/m2  Weight is 157 lbs 6.4 oz  Body mass index is 23.93 kg/(m^2).    Appearance: Young appearing [place]ar[place] female, wearing a bright yellow sweatshirt. Adequate hygiene. No acute distress.   Attitude:  Cooperative, calm, and pleasant   Eye Contact:  good  Mood:  good  Affect:  appropriate and in normal range  Speech:  clear, coherent, with adequate reciprocity   Language: Intact  Psychomotor Behavior:  no evidence of tardive dyskinesia, dystonia, or tics  Thought Process:  Overall remained logical, however had a tendency to go off into tangents at times.   Associations:  no loose associations  Thought Content:  no evidence of suicidal ideation or homicidal ideation, no evidence of psychotic thought, no auditory " hallucinations present and no visual hallucinations present  Insight:  partial  Judgement:  Poor- fair   Oriented to:  time, person, and place  Attention Span and Concentration:  fair  Recent and Remote Memory:  fair  Fund of knowledge: Average  Gait and Station: Normal      Assessment & Plan       Principal Diagnosis:   Bipolar 1 Disorder - most recent episode manic with psychosis.     Assessment:   Christine appears to be calm, cooperative, and appears to be utilizing her coping skills to help control her anxiety and mood. Her sleep seems to have continued to be well. Tolerating medications fine, with no rigidity or tremors or any GI discomfort. We discussed her court for today, along with expectations. Denies any SI/HI/AH/VH.     Plan:  1.) Bipolar Disorder  - Continue Lithium 450 mg BID  - Continue Risperdal 4 mg HS    2.) Sleep Problems  - Continue Melatonin 3 mg HS  - Continue Ativan 1 mg HS      Legal Status: Court Hold     Safety Assessment:   Checks: Status 15  Precautions: Sexual  Pt has not required locked seclusion or restraints in the past 24 hours to maintain safety, please refer to RN documentation for further details.       The risks, benefits, alternatives and side effects have been discussed and are understood by the patient and other caregivers.       Anticipated Disposition/Discharge Date: Unclear, patient is in the process of commitment with rodriguez. She will likely go to her parent's house.     Attestation:  Spent 20  minutes on encounter, >50% of which was spent in counseling and/or coordination of care, consisting of discussing medications, side effect/risks/benefits, symptom overview, brief therapy, and discussion of commitment issues.     Patient has been seen and evaluated by me,  Luis Carlos Wayne MD

## 2017-08-21 NOTE — PLAN OF CARE
Problem: Psychotic Symptoms  Goal: Psychotic Symptoms  Signs and symptoms of listed problems will be absent or manageable.     Patient will sleep at least 6 hours at night 8/13/17 Completed as hours reported by NOC are greater than 6.  Patient will take medications as prescribed  Patient will be able to maintain appropriate boundaries   Patient will be reality based   RN 48 hour assessment:     Patient is reality based at this time. She was appropriate in the milieu without being intrusive to anyone else. She left for a court hearing and is currently at court. Patient was compliant with taking her lithium this AM.

## 2017-08-22 PROCEDURE — 25000132 ZZH RX MED GY IP 250 OP 250 PS 637: Performed by: PSYCHIATRY & NEUROLOGY

## 2017-08-22 PROCEDURE — 99231 SBSQ HOSP IP/OBS SF/LOW 25: CPT | Performed by: PSYCHIATRY & NEUROLOGY

## 2017-08-22 PROCEDURE — 99207 ZZC CONSULT E&M CHANGED TO INITIAL LEVEL: CPT | Performed by: PHYSICIAN ASSISTANT

## 2017-08-22 PROCEDURE — 97150 GROUP THERAPEUTIC PROCEDURES: CPT | Mod: GO

## 2017-08-22 PROCEDURE — 99221 1ST HOSP IP/OBS SF/LOW 40: CPT | Performed by: PHYSICIAN ASSISTANT

## 2017-08-22 PROCEDURE — 12400007 ZZH R&B MH INTERMEDIATE UMMC

## 2017-08-22 PROCEDURE — 25000132 ZZH RX MED GY IP 250 OP 250 PS 637: Performed by: NURSE PRACTITIONER

## 2017-08-22 RX ADMIN — Medication 3 MG: at 21:15

## 2017-08-22 RX ADMIN — LORAZEPAM 1 MG: 1 TABLET ORAL at 21:15

## 2017-08-22 RX ADMIN — LITHIUM CARBONATE 450 MG: 450 TABLET, EXTENDED RELEASE ORAL at 08:00

## 2017-08-22 RX ADMIN — LITHIUM CARBONATE 450 MG: 450 TABLET, EXTENDED RELEASE ORAL at 21:14

## 2017-08-22 RX ADMIN — RISPERIDONE 4 MG: 2 TABLET ORAL at 21:14

## 2017-08-22 RX ADMIN — ACETAMINOPHEN 650 MG: 325 TABLET, FILM COATED ORAL at 19:30

## 2017-08-22 ASSESSMENT — ACTIVITIES OF DAILY LIVING (ADL)
GROOMING: INDEPENDENT
DRESS: SCRUBS (BEHAVIORAL HEALTH);INDEPENDENT
DRESS: INDEPENDENT
ORAL_HYGIENE: INDEPENDENT
GROOMING: SHOWER;INDEPENDENT
ORAL_HYGIENE: INDEPENDENT

## 2017-08-22 NOTE — PROGRESS NOTES
Call from pt dad. States that he is willing to take responsibility for pt taking medication, getting to appointments. Pt has hx at [facility], can return.
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
## 2017-08-22 NOTE — PLAN OF CARE
Problem: Psychotic Symptoms  Goal: Social and Therapeutic (Psychotic Symptoms)  Signs and symptoms of listed problems will be absent or manageable.      .Attended 1.0  of 2.0 possible O.T. Groups this morning. Pleasant, appropriate. Recalled project from last week, followed direction to completion.

## 2017-08-22 NOTE — PROGRESS NOTES
"Pt checked in feeling 'okay.' This writer asked the pt to elaborate on her emotional state, pt responded with 'good.' Pt's stated goal of the day was, \"to attend two groups\"; this goal has not been met yet. Pt plans to begin working on her personal plan of care. Pt presents as calm and cooperative with a full range affect. Pt has attended morning group and participated appropriately. Pt took a shower this morning after breakfast. Pt has present and visible in the milieu. Pt has been mildly social with other on the unit. Pt denies all mental health symptoms, including auditory and visual hallucinations. Pt denies SI and SIB. Pt is looking forward to having her parents visit this evening. During discussion, pt verbalized wanting to speak with her CTC to discuss her worries surrounding court and aftercare.      08/22/17 1038   Behavioral Health   Hallucinations denies / not responding to hallucinations   Thinking intact   Orientation person: oriented;place: oriented;date: oriented;time: oriented   Memory baseline memory   Insight poor   Judgement impaired   Eye Contact at examiner   Affect full range affect   Mood mood is calm   Physical Appearance/Attire attire appropriate to age and situation;neat   Hygiene well groomed;other (see comment)  (Showered )   Suicidality other (see comments)  (Pt denies SI)   Self Injury other (see comment)  (Pt denies SIB)   Elopement (No elopement concerns)   Activity other (see comment)  (Present and visable in the milieu)   Speech clear;coherent   Medication Sensitivity no observed side effects   Psychomotor / Gait balanced;steady   Safety   Suicidality status 15   Psycho Education   Type of Intervention 1:1 intervention   Response participates, initiates socially appropriate   Hours 0.5   Treatment Detail Wellness Strategies   Activities of Daily Living   Hygiene/Grooming shower;independent   Oral Hygiene independent   Dress scrubs (behavioral health);independent   Room Organization " independent   Activity   Activity Level of Assistance independent   Behavioral Health Interventions   Depression maintain safety precautions;monitor need to revise level of observation;maintain safe secure environment;assist patient in developing safety plan;assist patient in following safety plan;encourage nutrition and hydration;encourage participation / independence with adls;provide emotional support;establish therapeutic relationship;assist with developing and utilizing healthy coping strategies;build upon strengths   Social and Therapeutic Interventions (Depression) encourage socialization with peers;encourage effective boundaries with peers;encourage participation in therapeutic groups and milieu activities   Psychotic Symptoms maintain safety precautions;monitor need to revise level of observation;maintain safe secure environment;reality orientation;simple, clear language;decrease environmental stimulation;redirection of intrusive behaviors;redirection of aggressive behaviors;assist patient in developing safety plan;assist patient in following safety plan;encourage nutrition and hydration;encourage participation / independence with adls;provide emotional support;establish therapeutic relationship;assist with developing & utilizing healthy coping strategies;build upon strengths   Social and Therapeutic Interventions (Psychotic Symptoms) encourage socialization with peers;encourage effective boundaries with peers;encourage participation in therapeutic groups and milieu activities

## 2017-08-22 NOTE — PROGRESS NOTES
08/21/17 4290   Behavioral Health   Hallucinations denies / not responding to hallucinations   Thinking poor concentration   Orientation person: oriented   Memory confabulation   Insight admits / accepts   Judgement impaired   Affect blunted, flat   Mood mood is calm   Suicidality other (see comments)  (pt denies)   Self Injury other (see comment)  (pt denies)   Activities of Daily Living   Hygiene/Grooming independent   Oral Hygiene independent   Dress independent   Room Organization independent   Behavioral Health Interventions   Depression maintain safety precautions;maintain safe secure environment;provide emotional support   Social and Therapeutic Interventions (Depression) encourage socialization with peers;encourage effective boundaries with peers;encourage participation in therapeutic groups and milieu activities   Psychotic Symptoms maintain safety precautions;maintain safe secure environment   Social and Therapeutic Interventions (Psychotic Symptoms) encourage socialization with peers;encourage effective boundaries with peers;encourage participation in therapeutic groups and milieu activities
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
## 2017-08-23 LAB
ANION GAP SERPL CALCULATED.3IONS-SCNC: 10 MMOL/L (ref 3–14)
BUN SERPL-MCNC: 11 MG/DL (ref 7–30)
CALCIUM SERPL-MCNC: 8.8 MG/DL (ref 8.5–10.1)
CHLORIDE SERPL-SCNC: 107 MMOL/L (ref 94–109)
CO2 SERPL-SCNC: 24 MMOL/L (ref 20–32)
CREAT SERPL-MCNC: 0.5 MG/DL (ref 0.52–1.04)
GFR SERPL CREATININE-BSD FRML MDRD: >90 ML/MIN/1.7M2
GLUCOSE SERPL-MCNC: 91 MG/DL (ref 70–99)
POTASSIUM SERPL-SCNC: 3.9 MMOL/L (ref 3.4–5.3)
SODIUM SERPL-SCNC: 141 MMOL/L (ref 133–144)

## 2017-08-23 PROCEDURE — 12400007 ZZH R&B MH INTERMEDIATE UMMC

## 2017-08-23 PROCEDURE — 99231 SBSQ HOSP IP/OBS SF/LOW 25: CPT | Performed by: PSYCHIATRY & NEUROLOGY

## 2017-08-23 PROCEDURE — 36415 COLL VENOUS BLD VENIPUNCTURE: CPT | Performed by: PHYSICIAN ASSISTANT

## 2017-08-23 PROCEDURE — 25000132 ZZH RX MED GY IP 250 OP 250 PS 637: Performed by: PSYCHIATRY & NEUROLOGY

## 2017-08-23 PROCEDURE — 80048 BASIC METABOLIC PNL TOTAL CA: CPT | Performed by: PHYSICIAN ASSISTANT

## 2017-08-23 PROCEDURE — 90853 GROUP PSYCHOTHERAPY: CPT

## 2017-08-23 PROCEDURE — 25000132 ZZH RX MED GY IP 250 OP 250 PS 637: Performed by: NURSE PRACTITIONER

## 2017-08-23 PROCEDURE — H2032 ACTIVITY THERAPY, PER 15 MIN: HCPCS

## 2017-08-23 RX ORDER — IBUPROFEN 600 MG/1
600 TABLET, FILM COATED ORAL EVERY 6 HOURS PRN
Status: DISCONTINUED | OUTPATIENT
Start: 2017-08-23 | End: 2017-08-23

## 2017-08-23 RX ORDER — ACETAMINOPHEN 325 MG/1
975 TABLET ORAL EVERY 4 HOURS PRN
Status: DISCONTINUED | OUTPATIENT
Start: 2017-08-23 | End: 2017-08-23

## 2017-08-23 RX ORDER — ACETAMINOPHEN 325 MG/1
975 TABLET ORAL EVERY 6 HOURS PRN
Status: DISCONTINUED | OUTPATIENT
Start: 2017-08-23 | End: 2017-08-29 | Stop reason: HOSPADM

## 2017-08-23 RX ADMIN — Medication 3 MG: at 21:24

## 2017-08-23 RX ADMIN — ACETAMINOPHEN 975 MG: 325 TABLET, FILM COATED ORAL at 21:24

## 2017-08-23 RX ADMIN — LITHIUM CARBONATE 450 MG: 450 TABLET, EXTENDED RELEASE ORAL at 08:34

## 2017-08-23 RX ADMIN — RISPERIDONE 4 MG: 2 TABLET ORAL at 21:24

## 2017-08-23 RX ADMIN — LORAZEPAM 1 MG: 1 TABLET ORAL at 21:24

## 2017-08-23 RX ADMIN — LITHIUM CARBONATE 450 MG: 450 TABLET, EXTENDED RELEASE ORAL at 21:24

## 2017-08-23 ASSESSMENT — ACTIVITIES OF DAILY LIVING (ADL)
DRESS: INDEPENDENT
DRESS: STREET CLOTHES
GROOMING: HANDWASHING;SHOWER;INDEPENDENT
ORAL_HYGIENE: INDEPENDENT
ORAL_HYGIENE: INDEPENDENT
GROOMING: INDEPENDENT

## 2017-08-23 NOTE — PROGRESS NOTES
08/23/17 1511   Behavioral Health   Hallucinations denies / not responding to hallucinations   Thinking confused   Orientation person: oriented;place: oriented;date: oriented;time: oriented   Memory baseline memory   Insight poor   Judgement impaired   Eye Contact at examiner   Affect blunted, flat   Mood mood is calm   Physical Appearance/Attire neat   Hygiene well groomed   Suicidality other (see comments)  (none stated none observed)   Self Injury other (see comment)  (none stated none observed)   Activity withdrawn   Speech clear;coherent   Medication Sensitivity no stated side effects;no observed side effects   Psychomotor / Gait balanced;steady   Activities of Daily Living   Hygiene/Grooming handwashing;shower;independent   Oral Hygiene independent   Dress street clothes   Room Organization independent   Behavioral Health Interventions   Depression maintain safety precautions;monitor need to revise level of observation;maintain safe secure environment;assist patient in developing safety plan;assist patient in following safety plan;encourage nutrition and hydration;encourage participation / independence with adls;provide emotional support;assist with developing and utilizing healthy coping strategies;establish therapeutic relationship;build upon strengths   Social and Therapeutic Interventions (Depression) encourage socialization with peers;encourage effective boundaries with peers;encourage participation in therapeutic groups and milieu activities   Psychotic Symptoms maintain safety precautions;monitor need to revise level of observation;maintain safe secure environment;reality orientation;simple, clear language;decrease environmental stimulation;redirection of intrusive behaviors;redirection of aggressive behaviors;assist patient in developing safety plan;assist patient in following safety plan;encourage nutrition and hydration;encourage participation / independence with adls;provide emotional  support;establish therapeutic relationship;build upon strengths;assist with developing & utilizing healthy coping strategies   Social and Therapeutic Interventions (Psychotic Symptoms) encourage socialization with peers;encourage effective boundaries with peers;encourage participation in therapeutic groups and milieu activities

## 2017-08-23 NOTE — PROGRESS NOTES
VM from [facility]. Wants to fax GINA's to start referral process. Has called around for day treatment, [facility] and case management. Needs fax number.     CTC called back, left message with fax number. [phone]    Call from [facility]. [facility] has day treatment, [facility] and case management. Also plan for pt to go to [facility] and [facility] for psychiatry.

## 2017-08-23 NOTE — PROGRESS NOTES
08/22/17 1815   Behavioral Health   Hallucinations denies / not responding to hallucinations   Thinking intact   Orientation person: oriented   Memory baseline memory   Insight admits / accepts   Judgement intact   Affect blunted, flat   Mood mood is calm   Suicidality (pt denies)   Self Injury other (see comment)  (pt denies)   Activities of Daily Living   Hygiene/Grooming independent   Oral Hygiene independent   Dress independent   Room Organization independent   Behavioral Health Interventions   Depression maintain safety precautions;maintain safe secure environment;provide emotional support   Social and Therapeutic Interventions (Depression) encourage socialization with peers;encourage effective boundaries with peers;encourage participation in therapeutic groups and milieu activities   Psychotic Symptoms maintain safety precautions;maintain safe secure environment   Social and Therapeutic Interventions (Psychotic Symptoms) encourage socialization with peers;encourage effective boundaries with peers;encourage participation in therapeutic groups and milieu activities

## 2017-08-23 NOTE — PROGRESS NOTES
"[facility], [place]   Psychiatric Progress Note        Interim History:   Reason for Hospitalization: Christine is a 22 year old female with history of Bipolar Disorder with psychosis who was admitted on 8/2 [place] the [place] for worsening agitation and psychotic symptoms. Of note, she is in a court hold, and is currently in the process of being committed.     Subjective: \"My sleep has improved\"     As per today's interview: Patient was seen in the common are of the milieu, and was quite calm, pleasant and cooperative in speaking with me. She wears a head scarf today. She has been going to groups and feels that she is benefiting from it, along with using her coping skills. Denies any SI/HI/AH/VH. Regarding her hand pain, she agreed to take a higher dose of Tylenol.          Medications:       melatonin  3 mg Oral At Bedtime     risperiDONE  4 mg Oral At Bedtime     lithium  450 mg Oral Q12H ANA     LORazepam  1 mg Oral At Bedtime          Allergies:   No Known Allergies       Labs:     Recent Results (from the past 48 hour(s))   Basic metabolic panel    Collection Time: 08/23/17  7:40 AM   Result Value Ref Range    Sodium 141 133 - 144 mmol/L    Potassium 3.9 3.4 - 5.3 mmol/L    Chloride 107 94 - 109 mmol/L    Carbon Dioxide 24 20 - 32 mmol/L    Anion Gap 10 3 - 14 mmol/L    Glucose 91 70 - 99 mg/dL    Urea Nitrogen 11 7 - 30 mg/dL    Creatinine 0.50 (L) 0.52 - 1.04 mg/dL    GFR Estimate >90 >60 mL/min/1.7m2    GFR Estimate If Black >90 >60 mL/min/1.7m2    Calcium 8.8 8.5 - 10.1 mg/dL          Psychiatric Examination:   /73  Pulse 82  Temp 98.2  F (36.8  C)  Resp 16  Wt 72.1 kg (159 lb)  LMP  (LMP Unknown)  BMI 24.18 kg/m2  Weight is 159 lbs 0 oz  Body mass index is 24.18 kg/(m^2).    Appearance: Young appearing [place] female, wearing a bright yellow sweatshirt. Adequate hygiene. No acute distress.   Attitude:  Cooperative, calm, and pleasant   Eye Contact:  good  Mood:  good, " "\"Feeling ok\"   Affect:  appropriate and in normal range  Speech:  clear, coherent, with adequate reciprocity   Language: Intact  Psychomotor Behavior:  no evidence of tardive dyskinesia, dystonia, or tics  Thought Process:  Overall remained logical, however had a tendency to go off into tangents at times.   Associations:  no loose associations  Thought Content:  no evidence of suicidal ideation or homicidal ideation, no evidence of psychotic thought, no auditory hallucinations present and no visual hallucinations present  Insight:  partial  Judgement:  Poor- fair   Oriented to:  time, person, and place  Attention Span and Concentration:  fair  Recent and Remote Memory:  fair  Fund of knowledge: Average  Gait and Station: Normal      Assessment & Plan       Principal Diagnosis:   Bipolar 1 Disorder - most recent episode manic with psychosis.     Assessment:   Christine appears to be calm, cooperative, and appears to be utilizing her coping skills to help control her anxiety and mood. Her sleep seems to have continued to be well. Tolerating medications fine, with no rigidity or tremors or any GI discomfort. Regarding her hand pain, Ibuprofen was recommended by med consult, although since the patient is taking Lithium, it might not be the best (since Ibuprofen can increase Lithium levels), thus I will optimize her Tylenol dose to 975 mg. Denies any SI/HI/AH/VH. Of note, the SW has made an appointment with the psychiatrist at [facility] in [place].     Plan:  1.) Bipolar Disorder  - Continue Lithium 450 mg BID  - Continue Risperdal 4 mg HS    2.) Sleep Problems  - Continue Melatonin 3 mg HS  - Continue Ativan 1 mg HS    3.) Right Hand Pain:  - Change Tylenol to 975 mg Q6H PRN       Legal Status: Court Hold     Safety Assessment:   Checks: Status 15  Precautions: Sexual  Pt has not required locked seclusion or restraints in the past 24 hours to maintain safety, please refer to RN documentation for further details.       The " risks, benefits, alternatives and side effects have been discussed and are understood by the patient and other caregivers.       Anticipated Disposition/Discharge Date: Unclear, patient is in the process of commitment with rodriguez. She will likely go to her parent's house.     Attestation:  Spent 20  minutes on encounter, >50% of which was spent in counseling and/or coordination of care, consisting of discussing medications, side effect/risks/benefits, symptom overview, brief therapy, and discussion of commitment issues.     Patient has been seen and evaluated by me,  Luis Carlos Wayne MD

## 2017-08-23 NOTE — PROGRESS NOTES
PT signed GINA to [facility], [phone]. [facility] faxed information for day treatment to Ibeth at fax [phone]. Faxed information for case management to Radha at [phone]    [facility] faxed information to [facility] and Irene    [facility] called [facility] in [facility] appt with pt's psychiatrist  [address] [place] [address]  Phone: [phone]  Fax: [phone]  Appt: Thursday 9/21/2017 at 8:20 am  Vonda Choe

## 2017-08-24 PROCEDURE — 25000132 ZZH RX MED GY IP 250 OP 250 PS 637: Performed by: PSYCHIATRY & NEUROLOGY

## 2017-08-24 PROCEDURE — 90853 GROUP PSYCHOTHERAPY: CPT

## 2017-08-24 PROCEDURE — 12400007 ZZH R&B MH INTERMEDIATE UMMC

## 2017-08-24 PROCEDURE — 25000132 ZZH RX MED GY IP 250 OP 250 PS 637: Performed by: NURSE PRACTITIONER

## 2017-08-24 PROCEDURE — 99231 SBSQ HOSP IP/OBS SF/LOW 25: CPT | Performed by: PSYCHIATRY & NEUROLOGY

## 2017-08-24 RX ADMIN — Medication 3 MG: at 20:53

## 2017-08-24 RX ADMIN — LITHIUM CARBONATE 450 MG: 450 TABLET, EXTENDED RELEASE ORAL at 08:37

## 2017-08-24 RX ADMIN — LORAZEPAM 1 MG: 1 TABLET ORAL at 20:54

## 2017-08-24 RX ADMIN — LITHIUM CARBONATE 450 MG: 450 TABLET, EXTENDED RELEASE ORAL at 20:53

## 2017-08-24 RX ADMIN — RISPERIDONE 4 MG: 2 TABLET ORAL at 20:53

## 2017-08-24 RX ADMIN — ACETAMINOPHEN 975 MG: 325 TABLET, FILM COATED ORAL at 15:53

## 2017-08-24 ASSESSMENT — ACTIVITIES OF DAILY LIVING (ADL)
ORAL_HYGIENE: INDEPENDENT
GROOMING: INDEPENDENT
LAUNDRY: WITH SUPERVISION
ORAL_HYGIENE: INDEPENDENT
LAUNDRY: WITH SUPERVISION
GROOMING: INDEPENDENT
DRESS: STREET CLOTHES
DRESS: STREET CLOTHES

## 2017-08-24 NOTE — PROGRESS NOTES
Pt had a goal of wanting to have a successful day, goal was met. Pt attended groups. Affect was pleasant.   Pt was visible in milieu and social with peers.     Pt got news from [facility] that her discharge date is Tuesday, patient is elated at this news. Pt is excited to be going home and feels ready.     Pt is reporting no SI or SIB.     No visitors.      08/24/17 1500   Behavioral Health   Hallucinations denies / not responding to hallucinations   Thinking confused   Orientation person: oriented;place: oriented;date: oriented;time: oriented   Memory baseline memory   Insight poor   Judgement impaired   Eye Contact at examiner   Affect full range affect   Mood mood is calm;other (see comments)  (content )   Physical Appearance/Attire attire appropriate to age and situation   Hygiene well groomed;neglected grooming - unclean body, hair, teeth   Suicidality other (see comments)  (none stated nor observed )   Self Injury other (see comment)  (None stated nor observed )   Elopement (Not at this time )   Activity (Participatory )   Speech clear;coherent   Medication Sensitivity no stated side effects;no observed side effects   Psychomotor / Gait balanced;steady   Activities of Daily Living   Hygiene/Grooming independent   Oral Hygiene independent   Dress street clothes   Laundry with supervision   Room Organization independent   Activity   Activity Level of Assistance independent   Behavioral Health Interventions   Depression maintain safety precautions;monitor need to revise level of observation;maintain safe secure environment;assist patient in developing safety plan;assist patient in following safety plan;encourage nutrition and hydration;encourage participation / independence with adls;provide emotional support;establish therapeutic relationship;assist with developing and utilizing healthy coping strategies;build upon strengths;assess patient response to medication;assess medication adherance;monitor need for prn  medication;monitor confusion, memory loss, decision making ability and reorient / intervene as needed   Social and Therapeutic Interventions (Psychotic Symptoms) encourage socialization with peers;encourage effective boundaries with peers;encourage participation in therapeutic groups and milieu activities

## 2017-08-24 NOTE — PLAN OF CARE
"Problem: Psychotic Symptoms  Goal: Psychotic Symptoms  Signs and symptoms of listed problems will be absent or manageable.     Patient will sleep at least 6 hours at night 8/13/17 Completed as hours reported by NOC are greater than 6.  Patient will take medications as prescribed  Patient will be able to maintain appropriate boundaries   Patient will be reality based      Attended AM OT group focused on topic of Progress noted since admission. She stated she feels better \"when I got here the first week I can't remember anything, I can remember and feel so much better now.\" affect brightened some with interactions. She offered direct eye contact. Seemed engaged. Appeared calm.       "

## 2017-08-24 NOTE — PROGRESS NOTES
Attended OT General Health group focused on building and maintaining communication skills to enhance social participation and support networks.  Educated regarding building, maintaining and repairing relationships to enhance quality of life, decision making and ability to manage stress.    She was attentive, engaged and appropriate to topic at all times.  She read aloud and completed worksheet provided, as well as contributed thoughtfully to group process.

## 2017-08-24 NOTE — PROGRESS NOTES
CTC called pt's dad, Daniela, [phone].  Family discharge meeting ok for next Tuesday at 11 am    [facility] called [facility] in [facility], [phone]. Left message Advised that meeting will be Tuesday at 11 am. She will be involved via phone.

## 2017-08-24 NOTE — PROGRESS NOTES
"[facility], [place]   Psychiatric Progress Note        Interim History:   Reason for Hospitalization: Christine is a 22 year old female with history of Bipolar Disorder with psychosis who [place] for worsening agitation and psychotic symptoms. Of note, she is in a court hold, and is currently in the process of being committed.     Subjective: \"Feeling better\"     As per today's interview: Patient was seen in the common are of the milieu, and was quite calm, pleasant and cooperative in speaking with me. Denies any complaints, and feels that she is doing better. She wears a head scarf today. She has been going to groups and feels that she is benefiting from it, along with using her coping skills. Denies any SI/HI/AH/VH. She is future oriented, with goals of eventually going back to school, and working. Denies problematic hand pain today.          Medications:       melatonin  3 mg Oral At Bedtime     risperiDONE  4 mg Oral At Bedtime     lithium  450 mg Oral Q12H ANA     LORazepam  1 mg Oral At Bedtime          Allergies:   No Known Allergies       Labs:     Recent Results (from the past 48 hour(s))   Basic metabolic panel    Collection Time: 08/23/17  7:40 AM   Result Value Ref Range    Sodium 141 133 - 144 mmol/L    Potassium 3.9 3.4 - 5.3 mmol/L    Chloride 107 94 - 109 mmol/L    Carbon Dioxide 24 20 - 32 mmol/L    Anion Gap 10 3 - 14 mmol/L    Glucose 91 70 - 99 mg/dL    Urea Nitrogen 11 7 - 30 mg/dL    Creatinine 0.50 (L) 0.52 - 1.04 mg/dL    GFR Estimate >90 >60 mL/min/1.7m2    GFR Estimate If Black >90 >60 mL/min/1.7m2    Calcium 8.8 8.5 - 10.1 mg/dL          Psychiatric Examination:   /87  Pulse 84  Temp 98.1  F (36.7  C) (Oral)  Resp 16  Wt 73.1 kg (161 lb 1.6 oz)  LMP  (LMP Unknown)  BMI 24.5 kg/m2  Weight is 161 lbs 1.6 oz  Body mass index is 24.5 kg/(m^2).    Appearance: Young appearing Salvadorean female, wearing a bright yellow sweatshirt. " "Adequate hygiene. Wearing hijab. No acute distress.   Attitude:  Cooperative, calm, and pleasant   Eye Contact:  good  Mood:  good, \"Feeling ok\"   Affect:  appropriate and in normal range  Speech:  clear, coherent, with adequate reciprocity   Language: Intact  Psychomotor Behavior:  no evidence of tardive dyskinesia, dystonia, or tics  Thought Process:  Overall remained logical, however had a tendency to go off into tangents at times.   Associations:  no loose associations  Thought Content:  no evidence of suicidal ideation or homicidal ideation, no evidence of psychotic thought, no auditory hallucinations present and no visual hallucinations present  Insight:  partial  Judgement:  Poor- fair   Oriented to:  time, person, and place  Attention Span and Concentration:  fair  Recent and Remote Memory:  fair  Fund of knowledge: Average  Gait and Station: Normal      Assessment & Plan       Principal Diagnosis:   Bipolar 1 Disorder - most recent episode manic with psychosis.     Assessment:   Christine appears to be calm, cooperative, and appears to be utilizing her coping skills to help control her anxiety and mood. Her sleep seems to have continued to be well. Tolerating medications fine, with no rigidity or tremors or any GI discomfort. Hand pain has improved. Denies any SI/HI/AH/VH. I took time to explain her illness, and the importance of being compliant with medications. Of note, the SW has made an appointment with the psychiatrist at [facility] in [place]. Her outpatient plans will also include day treatment and therapy. Her father appears to be involved in helping her maintained medication compliance. Family meeting will be arranged prior to her discharge.     Plan:  1.) Bipolar Disorder  - Continue Lithium 450 mg BID  - Continue Risperdal 4 mg HS    2.) Sleep Problems  - Continue Melatonin 3 mg HS  - Continue Ativan 1 mg HS    3.) Right Hand Pain:  - Continue Tylenol to 975 mg Q6H PRN       Legal Status: Court Hold "     Safety Assessment:   Checks: Status 15  Precautions: Sexual  Pt has not required locked seclusion or restraints in the past 24 hours to maintain safety, please refer to RN documentation for further details.       The risks, benefits, alternatives and side effects have been discussed and are understood by the patient and other caregivers.       Anticipated Disposition/Discharge Date: Unclear, patient is in the process of commitment with rodriguez. She will likely go to her parent's house.     Attestation:  Spent 15 minutes on encounter, >50% of which was spent in counseling and/or coordination of care, consisting of discussing medications (and importance of compliance), side effect/risks/benefits, symptom overview, brief therapy, and education of her illness, and discussion of future goals.     Patient has been seen and evaluated by me,  Luis Carlos Wayne MD

## 2017-08-24 NOTE — PROGRESS NOTES
BEN from Radha at [facility], [phone]. CTC called back. She will fax a GINA for pt to sign so they can talk to pt's dad.     The [facility]  at [facility] is [facility], [phone]. [facility] will need a diagnostic assessment stating that ARHMS is medically necessary. CTC called, left message.     CTC received the GINA from Radha; pt signed and it was faxed back to Radha.     CTC called Digna from [facility]. Left message Advised that the H&P was sent. Also had pt sign the additional GINA's and faxed them back to Digna.     BEN from [facility]. States that they need a rule 47 diagnostic assessment for ARHMS referral. If that can not be done here they can schedule an intake on an outpatient basis.     CTC called back, [phone]. Left message that there is no current rule 47 done at this facility. Requested an intake.
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
## 2017-08-25 PROCEDURE — 12400007 ZZH R&B MH INTERMEDIATE UMMC

## 2017-08-25 PROCEDURE — 25000132 ZZH RX MED GY IP 250 OP 250 PS 637: Performed by: PSYCHIATRY & NEUROLOGY

## 2017-08-25 PROCEDURE — 90853 GROUP PSYCHOTHERAPY: CPT

## 2017-08-25 PROCEDURE — 25000132 ZZH RX MED GY IP 250 OP 250 PS 637: Performed by: NURSE PRACTITIONER

## 2017-08-25 PROCEDURE — H2032 ACTIVITY THERAPY, PER 15 MIN: HCPCS

## 2017-08-25 PROCEDURE — 99231 SBSQ HOSP IP/OBS SF/LOW 25: CPT | Performed by: PSYCHIATRY & NEUROLOGY

## 2017-08-25 RX ADMIN — Medication 3 MG: at 20:58

## 2017-08-25 RX ADMIN — LORAZEPAM 1 MG: 1 TABLET ORAL at 20:58

## 2017-08-25 RX ADMIN — LITHIUM CARBONATE 450 MG: 450 TABLET, EXTENDED RELEASE ORAL at 20:59

## 2017-08-25 RX ADMIN — LITHIUM CARBONATE 450 MG: 450 TABLET, EXTENDED RELEASE ORAL at 08:38

## 2017-08-25 RX ADMIN — RISPERIDONE 4 MG: 2 TABLET ORAL at 20:57

## 2017-08-25 ASSESSMENT — ACTIVITIES OF DAILY LIVING (ADL)
ORAL_HYGIENE: INDEPENDENT
DRESS: SCRUBS (BEHAVIORAL HEALTH)
GROOMING: INDEPENDENT

## 2017-08-25 NOTE — PROGRESS NOTES
"   08/25/17 1700   Behavioral Health   Hallucinations denies / not responding to hallucinations   Thinking intact   Orientation time: oriented;date: oriented;place: oriented   Memory baseline memory   Insight insight appropriate to situation   Judgement intact   Eye Contact at examiner   Affect full range affect   Mood other (see comments)  (Pt states she is excited and happy for discharge)   Physical Appearance/Attire neat   Hygiene well groomed   Suicidality other (see comments)  (None)   Self Injury other (see comment)  (No concerns)   Elopement (No concerns)   Activity (Pt is active and social within the mileu and groups)   Speech clear;coherent   Medication Sensitivity no observed side effects   Psychomotor / Gait steady   Psycho Education   Type of Intervention 1:1 intervention   Response participates, initiates socially appropriate   Hours 0.5   Treatment Detail (Check in)   Activities of Daily Living   Hygiene/Grooming independent   Oral Hygiene independent   Dress scrubs (behavioral health)   Room Organization independent       Pt states she is feeling significantly better than prior weeks. Pt discussed how excited she was to go home and go outside. Pt states she \"misses the fresh air\" and her favorite foods. Pt talked about how she plans to go back to school in the Spring and wants to be an  because she loves children. Pt looks and feels better every day. Pt's mood is hopeful and cheerful.   "

## 2017-08-25 NOTE — PROGRESS NOTES
"[facility], [place]   Psychiatric Progress Note        Interim History:   Reason for Hospitalization: Christine is a 22 year old female with history of Bipolar Disorder with psychosis who was [place] for worsening agitation and psychotic symptoms. Of note, she is in a court hold, and is currently in the process of being committed.     Subjective: \"Feeling better\"     As per today's interview: Patient was seen in the common are of the milieu, and was quite calm, pleasant and cooperative in speaking with me. Denies any complaints, and feels that she is doing better. She wears a head scarf today. She has been going to groups and feels that she is benefiting from it, along with using her coping skills. Denies any SI/HI/AH/VH. She is future oriented, with goals of eventually going back to school, and working. Denies problematic hand pain today.          Medications:       melatonin  3 mg Oral At Bedtime     risperiDONE  4 mg Oral At Bedtime     lithium  450 mg Oral Q12H ANA     LORazepam  1 mg Oral At Bedtime          Allergies:   No Known Allergies       Labs:     No results found for this or any previous visit (from the past 48 hour(s)).       Psychiatric Examination:   /87  Pulse 84  Temp 98.2  F (36.8  C) (Oral)  Resp 16  Wt 73.1 kg (161 lb 1.6 oz)  LMP  (LMP Unknown)  BMI 24.5 kg/m2  Weight is 161 lbs 1.6 oz  Body mass index is 24.5 kg/(m^2).    Appearance: Young appearing Taiwanese female, wearing a bright yellow sweatshirt. Adequate hygiene. Wearing hijab. No acute distress.   Attitude:  Cooperative, calm, and pleasant   Eye Contact:  good  Mood:  good, \"Feeling ok\"   Affect:  appropriate and in normal range  Speech:  clear, coherent, with adequate reciprocity   Language: Intact  Psychomotor Behavior:  no evidence of tardive dyskinesia, dystonia, or tics  Thought Process:  Overall remained logical, however had a tendency to go off into tangents at " times.   Associations:  no loose associations  Thought Content:  no evidence of suicidal ideation or homicidal ideation, no evidence of psychotic thought, no auditory hallucinations present and no visual hallucinations present  Insight:  partial  Judgement:  Poor- fair   Oriented to:  time, person, and place  Attention Span and Concentration:  fair  Recent and Remote Memory:  fair  Fund of knowledge: Average  Gait and Station: Normal      Assessment & Plan       Principal Diagnosis:   Bipolar 1 Disorder - most recent episode manic with psychosis.     Assessment:   Christine appears to be calm, cooperative, and appears to be utilizing her coping skills to help control her anxiety and mood. Her sleep seems to have continued to be well. Tolerating medications fine, with no rigidity or tremors or any GI discomfort. Hand pain has improved. Denies any SI/HI/AH/VH. I took time to explain her illness, and the importance of being compliant with medications. Of note, the SW has made an appointment with the psychiatrist at [facility] in [place]. Her outpatient plans will also include day treatment and therapy. Her father appears to be involved in helping her maintained medication compliance. Family meeting will be arranged prior to her discharge.     Plan:  1.) Bipolar Disorder  - Continue Lithium 450 mg BID  - Continue Risperdal 4 mg HS    2.) Sleep Problems  - Continue Melatonin 3 mg HS  - Continue Ativan 1 mg HS    3.) Right Hand Pain:  - Continue Tylenol to 975 mg Q6H PRN       Legal Status: Court Hold     Safety Assessment:   Checks: Status 15  Precautions: Sexual  Pt has not required locked seclusion or restraints in the past 24 hours to maintain safety, please refer to RN documentation for further details.       The risks, benefits, alternatives and side effects have been discussed and are understood by the patient and other caregivers.       Anticipated Disposition/Discharge Date: Unclear, patient is in the process of  commitment with rodriguez. She will likely go to her parent's house.     Attestation:  Spent 15 minutes on encounter, >50% of which was spent in counseling and/or coordination of care, consisting of discussing medications (and importance of compliance), side effect/risks/benefits, symptom overview, brief therapy, and education of her illness, and discussion of future goals.     Patient has been seen and evaluated by me,  Luis Carlos Wayne MD

## 2017-08-25 NOTE — PROGRESS NOTES
Brief Medicine Note    Medicine following for gonorrhea/chlamydia lab results.    Appears patient still has not provided a urine sample for testing.     Medicine will sign off at this time. Please contact the medicine team if patient becomes agreeable to G/C testing and results are abnormal.    Kaitlynn Serrano PA-C  Hospitalist Service  [phone]

## 2017-08-25 NOTE — PROGRESS NOTES
08/24/17 2100   Behavioral Health   Hallucinations denies / not responding to hallucinations   Thinking intact   Orientation person: oriented;place: oriented;date: oriented;time: oriented   Memory baseline memory   Insight poor   Judgement impaired   Eye Contact at examiner   Affect full range affect   Mood mood is calm;anxious   Physical Appearance/Attire attire appropriate to age and situation   Hygiene well groomed   Suicidality other (see comments)  (Pt denies)   Self Injury other (see comment)  (Pt denies)   Elopement (none stated or observed)   Activity (active and social in milieu)   Speech clear;coherent   Medication Sensitivity no stated side effects;no observed side effects   Psychomotor / Gait balanced;steady   Activities of Daily Living   Hygiene/Grooming independent   Oral Hygiene independent   Dress street clothes   Laundry with supervision   Room Organization independent     Pt had a good shift. Pt is very excited about discharging on Tuesday. Pt's goal is to take her medication that helps with sleep, and continue to do what she is doing with participating and staying positive with staff and peer. Pt denies any SI or SIB. Pt is calm and in a good mood.

## 2017-08-25 NOTE — PLAN OF CARE
Problem: General Plan of Care (Inpatient Behavioral)  Goal: Individualization/Patient Specific Goal (IP Behavioral)  The patient and/or their representative will achieve their patient-specific goals related to the plan of care.    The patient-specific goals include:     Illness Management Recovery model: Objectives  Patient will identify reason(s) for hospitalization from their perspective.  Patient will identify a minimum of three goals for discharge.  Patient will identify a minimum of three triggers that may increase their symptoms.  Patient will identify a minimum of three coping skills they can do to stay well.   Patient will identify their support system to demonstrate readiness for discharge.    Illness Management & Recovery assists patient to develop relapse prevention as  patient identifies triggers for relapse.  patient identifies a general wellness strategy.  patient identifies the warning signs that they are in danger of relapse.  patient identifies someone they count on to get feedback .  patient identifies ways to take action when in danger of relapse.  patient identifies way to cope with stress or other symptoms.   patient participates in self-reflection.     Illness Management Recovery model:  Taking Action.     Patient identified the following actions they can take when early warning signs are present in order to prevent relapse:     1. Poor sleep  2. Increased anxiety/fear  3. Can't tell what is real  4. I look hungry

## 2017-08-25 NOTE — PROGRESS NOTES
Pt. Attended 1 scheduled OT sessions today. Pt. Participated in a Life Skills session which focussed on a review of the week and planning for the weekend. Pt was social and organized in her efforts.  Pt.was cooperative and pleasant throughout session.

## 2017-08-25 NOTE — PLAN OF CARE
"Problem: Psychotic Symptoms  Goal: Psychotic Symptoms  Signs and symptoms of listed problems will be absent or manageable.     Patient will sleep at least 6 hours at night 8/13/17 Completed as hours reported by NOC are greater than 6.  Patient will take medications as prescribed 8/25/17 Completed  Patient will be able to maintain appropriate boundaries 8/25/17 Completed  Patient will be reality based 8/25/17  Outcome: Improving  RN48/      Patient is hopeful stating \"I am excited to be better\" and stated GOAL is \"Discharge to home, with parents, next week.\"     Patient rates depression 0/10* c/o   Patient rates anxiety at 0/10* c/o   Patient describes mood as \"description consistent with euthymia.\"     Patient is cooperative, pleasant and calm appearing Appropriate/mood-congruent and Appropriate to situation . Patient is med-compliant, is eating 100% and reports \"sleeps through the night\". Patient is attending groups.     Patient denies current or recent suicidal ideation    SIB denies    HI: denies current or recent homicidal ideation or behaviors.     VS reviewed: /87  Pulse 84  Temp 98.2  F (36.8  C) (Oral)  Resp 16  Wt 73.1 kg (161 lb 1.6 oz)  LMP  (LMP Unknown)  BMI 24.5 kg/m2 . Patient denies  Pain (declined PRN for hand stating it is \"okay\").     Patient evaluation continues. Assessed mood,anxiety,thoughts and behavior. Patient is progressing towards goals. Patient is encouraged to participate in groups and assisted to develop healthy coping skills.      Length of stay: 22     Refer to daily team meeting notes for individualized plan of care. Nursing will continue to assess.     * Scale is offered as scale of 1 to 10 with 10 being the worst.                                                                                                                    "

## 2017-08-26 PROCEDURE — 87591 N.GONORRHOEAE DNA AMP PROB: CPT | Performed by: PHYSICIAN ASSISTANT

## 2017-08-26 PROCEDURE — 25000132 ZZH RX MED GY IP 250 OP 250 PS 637: Performed by: PSYCHIATRY & NEUROLOGY

## 2017-08-26 PROCEDURE — 90853 GROUP PSYCHOTHERAPY: CPT

## 2017-08-26 PROCEDURE — 25000132 ZZH RX MED GY IP 250 OP 250 PS 637: Performed by: NURSE PRACTITIONER

## 2017-08-26 PROCEDURE — 12400007 ZZH R&B MH INTERMEDIATE UMMC

## 2017-08-26 PROCEDURE — 87491 CHLMYD TRACH DNA AMP PROBE: CPT | Performed by: PHYSICIAN ASSISTANT

## 2017-08-26 RX ADMIN — LITHIUM CARBONATE 450 MG: 450 TABLET, EXTENDED RELEASE ORAL at 20:56

## 2017-08-26 RX ADMIN — LORAZEPAM 1 MG: 1 TABLET ORAL at 20:57

## 2017-08-26 RX ADMIN — RISPERIDONE 4 MG: 2 TABLET ORAL at 20:57

## 2017-08-26 RX ADMIN — LITHIUM CARBONATE 450 MG: 450 TABLET, EXTENDED RELEASE ORAL at 08:35

## 2017-08-26 RX ADMIN — Medication 3 MG: at 21:37

## 2017-08-26 ASSESSMENT — ACTIVITIES OF DAILY LIVING (ADL)
DRESS: INDEPENDENT
ORAL_HYGIENE: INDEPENDENT
GROOMING: INDEPENDENT
GROOMING: INDEPENDENT
DRESS: INDEPENDENT
ORAL_HYGIENE: INDEPENDENT
LAUNDRY: WITH SUPERVISION
LAUNDRY: WITH SUPERVISION

## 2017-08-26 NOTE — PLAN OF CARE
Problem: Psychotic Symptoms  Goal: Psychotic Symptoms  Signs and symptoms of listed problems will be absent or manageable.     Patient will sleep at least 6 hours at night 8/13/17 Completed as hours reported by NOC are greater than 6.  Patient will take medications as prescribed 8/25/17 Completed  Patient will be able to maintain appropriate boundaries 8/25/17 Completed  Patient will be reality based 8/25/17      Attended OT group. Participated in activity utilizing quick and creative thinking. Answers were clearly expressed and in context. She was pleasant, worked with peers supported others. She offered multiple spontaneous answers and took an active role in helping with details on the group task. Appeared alert, interested.

## 2017-08-26 NOTE — PLAN OF CARE
Problem: General Plan of Care (Inpatient Behavioral)  Goal: Individualization/Patient Specific Goal (IP Behavioral)  The patient and/or their representative will achieve their patient-specific goals related to the plan of care.    The patient-specific goals include:     Illness Management Recovery model: Objectives    Illness Management Recovery model: Objectives  Patient will identify reason(s) for hospitalization from their perspective.  Patient will identify a minimum of three goals for discharge.  Patient will identify a minimum of three triggers that may increase their symptoms.  Patient will identify a minimum of three coping skills they can do to stay well.   Patient will identify their support system to demonstrate readiness for discharge.    Illness Management & Recovery assists patient to develop relapse prevention as  patient identifies triggers for relapse.  patient identifies a general wellness strategy.  patient identifies the warning signs that they are in danger of relapse.  patient identifies someone they count on to get feedback .  patient identifies ways to take action when in danger of relapse.  patient identifies way to cope with stress or other symptoms.   patient participates in self-reflection.  Illness Management Recovery model:  Ways to [place].     Patient identified the following specific strategies to cope with their symptoms:     1. Take meds  2. Eat good  3. Sleep enough

## 2017-08-26 NOTE — PLAN OF CARE
"Problem: Psychotic Symptoms  Goal: Psychotic Symptoms  Signs and symptoms of listed problems will be absent or manageable.     Patient will sleep at least 6 hours at night 8/13/17 Completed as hours reported by NOC are greater than 6.  Patient will take medications as prescribed 8/25/17 Completed  Patient will be able to maintain appropriate boundaries 8/25/17 Completed  Patient will be reality based 8/25/17   Outcome: Improving  Patient is calm, cooperative, pleasant with a bright affect and reports \"happy parents are coming to visit today.\" Patient is sleeping (NOC report) all night, eating 100% and med-compliant. Patient independently seeks scheduled medcations at scheduled time. Patient is attending groups and socially appropriate with peers/staff.     /87  Pulse 84  Temp 97.3  F (36.3  C) (Tympanic)  Resp 16  Wt 73.1 kg (161 lb 1.6 oz)  LMP  (LMP Unknown)  BMI 24.5 kg/m2. Patient denies pain.      Patient denies SI/SIB/HI.     Nursing will continue to monitor.          "

## 2017-08-27 LAB
C TRACH DNA SPEC QL NAA+PROBE: NEGATIVE
N GONORRHOEA DNA SPEC QL NAA+PROBE: NEGATIVE
SPECIMEN SOURCE: NORMAL
SPECIMEN SOURCE: NORMAL

## 2017-08-27 PROCEDURE — 25000132 ZZH RX MED GY IP 250 OP 250 PS 637: Performed by: PSYCHIATRY & NEUROLOGY

## 2017-08-27 PROCEDURE — 25000132 ZZH RX MED GY IP 250 OP 250 PS 637: Performed by: NURSE PRACTITIONER

## 2017-08-27 PROCEDURE — 12400007 ZZH R&B MH INTERMEDIATE UMMC

## 2017-08-27 RX ADMIN — Medication 3 MG: at 21:01

## 2017-08-27 RX ADMIN — LITHIUM CARBONATE 450 MG: 450 TABLET, EXTENDED RELEASE ORAL at 08:23

## 2017-08-27 RX ADMIN — LITHIUM CARBONATE 450 MG: 450 TABLET, EXTENDED RELEASE ORAL at 20:59

## 2017-08-27 RX ADMIN — LORAZEPAM 1 MG: 1 TABLET ORAL at 21:02

## 2017-08-27 RX ADMIN — RISPERIDONE 4 MG: 2 TABLET ORAL at 20:59

## 2017-08-27 ASSESSMENT — ACTIVITIES OF DAILY LIVING (ADL)
LAUNDRY: WITH SUPERVISION
LAUNDRY: WITH SUPERVISION
GROOMING: INDEPENDENT
ORAL_HYGIENE: INDEPENDENT
DRESS: INDEPENDENT
DRESS: INDEPENDENT
GROOMING: INDEPENDENT
ORAL_HYGIENE: INDEPENDENT

## 2017-08-27 NOTE — PLAN OF CARE
"Problem: Psychotic Symptoms  Goal: Psychotic Symptoms  Signs and symptoms of listed problems will be absent or manageable.     Patient will sleep at least 6 hours at night 8/13/17 Completed as hours reported by NOC are greater than 6.  Patient will take medications as prescribed 8/25/17 Completed  Patient will be able to maintain appropriate boundaries 8/25/17 Completed  Patient will be reality based 8/25/17   Patient will have adequate intake to promote health 8/27/17 Completed  Patient denies SI/SIB 8/27/17   Outcome: Improving  RN48/      Patient is hopeful stating \"I am so excited to go home-Tues\" and stated GOAL is \"Keep taking medication and return to school.\"     Patient rates depression 0/10* c/o   Patient rates anxiety at 0/10* c/o   Patient describes mood as \"description consistent with euthymia.\"     Patient is cooperative, pleasant and calm appearing Appropriate/mood-congruent and Appropriate to situation . Patient is med-compliant, is eating 100% and reports \"sleeps through the night\". Patient is attending groups.     Patient denies current or recent suicidal ideation    SIB denies    HI: denies current or recent homicidal ideation or behaviors.     VS reviewed: /87  Pulse 84  Temp 97.2  F (36.2  C) (Oral)  Resp 16  Wt 75.3 kg (166 lb)  LMP  (LMP Unknown)  BMI 25.24 kg/m2 . Patient denies  pain.     Patient evaluation continues. Assessed mood,anxiety,thoughts and behavior. Patient is progressing towards goals. Patient is encouraged to participate in groups and assisted to develop healthy coping skills.      Length of stay: 24     Refer to daily team meeting notes for individualized plan of care. Nursing will continue to assess.     * Scale is offered as scale of 1 to 10 with 10 being the worst.                                                                                                                    "

## 2017-08-27 NOTE — PLAN OF CARE
Problem: General Plan of Care (Inpatient Behavioral)  Goal: Individualization/Patient Specific Goal (IP Behavioral)  The patient and/or their representative will achieve their patient-specific goals related to the plan of care.    The patient-specific goals include:     Illness Management Recovery model: Objectives    Illness Management Recovery model: Objectives  Patient will identify reason(s) for hospitalization from their perspective.  Patient will identify a minimum of three goals for discharge.  Patient will identify a minimum of three triggers that may increase their symptoms.  Patient will identify a minimum of three coping skills they can do to stay well.   Patient will identify their support system to demonstrate readiness for discharge.    Illness Management & Recovery assists patient to develop relapse prevention as  patient identifies triggers for relapse.  patient identifies a general wellness strategy.  patient identifies the warning signs that they are in danger of relapse.  patient identifies someone they count on to get feedback .  patient identifies ways to take action when in danger of relapse.  patient identifies way to cope with stress or other symptoms.   patient participates in self-reflection.       Outcome: Improving  Illness Management Recovery model:  Self-Reflection & Planning.     Assessed patient's progress completing forms related to Illness Management Recovery (including Personal Plan of Care, Adult Coping Plan, and My Support and Coping Plan) and assisted as needed.     Encouraged patient to continue to consider triggers, wellness strategies, early warning signs, feedback from others, actions to take to prevent relapse, and coping strategies as part of a plan to remain well after leaving the hospital.

## 2017-08-27 NOTE — PROGRESS NOTES
"   08/26/17 2100   Behavioral Health   Hallucinations denies / not responding to hallucinations   Thinking intact   Orientation person: oriented;place: oriented   Memory baseline memory   Insight insight appropriate to situation   Judgement impaired   Eye Contact at examiner   Affect full range affect   Mood mood is calm   Physical Appearance/Attire neat   Hygiene well groomed   Suicidality other (see comments)  (denies )   Self Injury other (see comment)  (denies )   Activity other (see comment)  (visible in the milieu and socialized with others)   Speech clear;coherent   Activities of Daily Living   Hygiene/Grooming independent   Oral Hygiene independent   Dress independent   Laundry with supervision   Room Organization independent       Patient denied SI and SIB.  Patient reported feeling \" I'm happy actually I'm getting discharge.\"  Patient seems calm, visible in the milieu and socialized with others.  Patient daily goal \" just have a normal day.\"  Patient goal after discharge \" go home relax for a bit go outside and apply for a job.\"  Patient reported no nutritional concerns \" I don't eat pork.\"  Patient is independent with ADL's.  Patient wants visitors.    "

## 2017-08-28 PROBLEM — F31.9 BIPOLAR I DISORDER (H): Status: ACTIVE | Noted: 2017-08-28

## 2017-08-28 PROBLEM — G47.00 INSOMNIA: Status: RESOLVED | Noted: 2017-08-28 | Resolved: 2017-08-28

## 2017-08-28 PROCEDURE — 25000132 ZZH RX MED GY IP 250 OP 250 PS 637: Performed by: NURSE PRACTITIONER

## 2017-08-28 PROCEDURE — 12400007 ZZH R&B MH INTERMEDIATE UMMC

## 2017-08-28 PROCEDURE — 90853 GROUP PSYCHOTHERAPY: CPT

## 2017-08-28 PROCEDURE — 25000132 ZZH RX MED GY IP 250 OP 250 PS 637: Performed by: PSYCHIATRY & NEUROLOGY

## 2017-08-28 RX ORDER — LORAZEPAM 1 MG/1
1 TABLET ORAL AT BEDTIME
Qty: 30 TABLET | Refills: 0 | Status: SHIPPED | OUTPATIENT
Start: 2017-08-28

## 2017-08-28 RX ORDER — LANOLIN ALCOHOL/MO/W.PET/CERES
3 CREAM (GRAM) TOPICAL AT BEDTIME
Qty: 30 TABLET | Refills: 0 | Status: SHIPPED | OUTPATIENT
Start: 2017-08-28

## 2017-08-28 RX ORDER — LITHIUM CARBONATE 450 MG
450 TABLET, EXTENDED RELEASE ORAL EVERY 12 HOURS
Qty: 60 TABLET | Refills: 0 | Status: SHIPPED | OUTPATIENT
Start: 2017-08-28

## 2017-08-28 RX ORDER — RISPERIDONE 4 MG/1
4 TABLET ORAL AT BEDTIME
Qty: 30 TABLET | Refills: 0 | Status: SHIPPED | OUTPATIENT
Start: 2017-08-28

## 2017-08-28 RX ORDER — BENZTROPINE MESYLATE 1 MG/1
1 TABLET ORAL 2 TIMES DAILY PRN
Qty: 60 TABLET | Refills: 0 | Status: SHIPPED | OUTPATIENT
Start: 2017-08-28

## 2017-08-28 RX ADMIN — LORAZEPAM 1 MG: 1 TABLET ORAL at 20:59

## 2017-08-28 RX ADMIN — LITHIUM CARBONATE 450 MG: 450 TABLET, EXTENDED RELEASE ORAL at 08:49

## 2017-08-28 RX ADMIN — LITHIUM CARBONATE 450 MG: 450 TABLET, EXTENDED RELEASE ORAL at 20:59

## 2017-08-28 RX ADMIN — RISPERIDONE 4 MG: 2 TABLET ORAL at 20:59

## 2017-08-28 RX ADMIN — Medication 3 MG: at 20:59

## 2017-08-28 ASSESSMENT — ACTIVITIES OF DAILY LIVING (ADL)
GROOMING: INDEPENDENT
LAUNDRY: WITH SUPERVISION
GROOMING: INDEPENDENT
DRESS: STREET CLOTHES
ORAL_HYGIENE: INDEPENDENT
ORAL_HYGIENE: INDEPENDENT
DRESS: INDEPENDENT
LAUNDRY: WITH SUPERVISION

## 2017-08-28 NOTE — PROGRESS NOTES
CTC called Hallie in [facility], [phone]. Left message asking about appointments.

## 2017-08-28 NOTE — PROGRESS NOTES
"[facility], [place]   Psychiatric Progress Note        Interim History:   Reason for Hospitalization: Christine is a 22 year old female with history of Bipolar Disorder with psychosis who [place] for worsening agitation and psychotic symptoms. Of note, she is in a court hold, and is currently in the process of being committed.     Subjective: \"Feeling much better, I think the meds have helped me\"     As per today's interview: Patient was seen in the common are of the milieu, and was quite calm, pleasant and cooperative in speaking with me. Denies any complaints, and feels that she is doing better. She has been going to groups and feels that she is benefiting from it, along with using her coping skills. Denies any SI/HI/AH/VH. She is future oriented, with goals of eventually going back to school, and working. Excited to leave tomorrow.         Medications:       melatonin  3 mg Oral At Bedtime     risperiDONE  4 mg Oral At Bedtime     lithium  450 mg Oral Q12H ANA     LORazepam  1 mg Oral At Bedtime          Allergies:   No Known Allergies       Labs:     No results found for this or any previous visit (from the past 48 hour(s)).       Psychiatric Examination:   /87  Pulse 84  Temp 97.7  F (36.5  C) (Oral)  Resp 16  Wt 75.3 kg (166 lb)  LMP  (LMP Unknown)  BMI 25.24 kg/m2  Weight is 166 lbs 0 oz  Body mass index is 25.24 kg/(m^2).    Appearance: Young appearing Guine[place] female, wearing a bright yellow sweatshirt. Adequate hygiene. Wearing hijab. No acute distress.   Attitude:  Cooperative, calm, and pleasant   Eye Contact:  good  Mood:  good, \"Feeling pretty good\"   Affect:  appropriate and in normal range  Speech:  clear, coherent, with adequate reciprocity   Language: Intact  Psychomotor Behavior:  no evidence of tardive dyskinesia, dystonia, or tics  Thought Process:  Overall remained logical, however had a tendency to go off into tangents at " times.   Associations:  no loose associations  Thought Content:  no evidence of suicidal ideation or homicidal ideation, no evidence of psychotic thought, no auditory hallucinations present and no visual hallucinations present  Insight:  partial  Judgement:  Poor- fair   Oriented to:  time, person, and place  Attention Span and Concentration:  fair  Recent and Remote Memory:  fair  Fund of knowledge: Average  Gait and Station: Normal      Assessment & Plan       Principal Diagnosis:   Bipolar 1 Disorder - most recent episode manic with psychosis.     Assessment:   Christine appears to be calm, cooperative, and appears to be utilizing her coping skills to help control her anxiety and mood. Her sleep seems to have continued to be well. Tolerating medications fine, with no rigidity or tremors or any GI discomfort. She seems to have benefited from her medications.     Hand pain has improved. Denies any SI/HI/AH/VH. I took time to explain her illness, and the importance of being compliant with medications. Her outpatient plans will also include day treatment and therapy. Her father appears to be involved in helping her maintained medication compliance. Family meeting will be arranged tomorrow prior to discharge.     Plan:  1.) Bipolar Disorder  - Continue Lithium 450 mg BID  - Continue Risperdal 4 mg HS    2.) Sleep Problems  - Continue Melatonin 3 mg HS  - Continue Ativan 1 mg HS    3.) Right Hand Pain:  - Continue Tylenol to 975 mg Q6H PRN       Legal Status: Court Hold     Safety Assessment:   Checks: Status 15  Precautions: Sexual  Pt has not required locked seclusion or restraints in the past 24 hours to maintain safety, please refer to RN documentation for further details.       The risks, benefits, alternatives and side effects have been discussed and are understood by the patient and other caregivers.       Anticipated Disposition/Discharge Date: Unclear, patient is in the process of commitment with rodriguez. She will  likely go to her parent's house.     Attestation:  Spent 15 minutes on encounter, >50% of which was spent in counseling and/or coordination of care, consisting of discussing medications (and importance of compliance), side effect/risks/benefits, symptom overview, brief therapy, and education of her illness, and discussion of future goals.     Patient has been seen and evaluated by me,  Luis Carlos Wayne MD

## 2017-08-28 NOTE — PROGRESS NOTES
Christine had another good shift. Was present and appropriate in milieu, joking with peers, pleasant with staff. Full affect. Denies SI/SIB/AH/VH. States she's excited to go home to her parents Tuesday morning. Discussed her goals of getting a low stress part time job if she can while she's attending day treatment and wanting to restart classes in the spring for education. Reinforced importance of self-care during treatment, but appears to show appropriate insight and motivation. Will continue to monitor.

## 2017-08-28 NOTE — PROGRESS NOTES
Behavioral Health  Note    Behavioral Health  Spirituality Group Note    [address]    Name: Christine Ryan YOB: 1995   MRN: [id number] Age: 22 year old      Patient attended -led group, which included discussion of spirituality, coping with illness and building resilience.    Patient attended group for 1.0 hrs.    The patient actively participated in group discussion and patient demonstrated an appreciation of topic's application for their personal circumstances.    Romaine Loza MDiv.  Chaplain Resident  Pager [phone]

## 2017-08-29 VITALS
TEMPERATURE: 98.2 F | SYSTOLIC BLOOD PRESSURE: 121 MMHG | WEIGHT: 167.3 LBS | RESPIRATION RATE: 16 BRPM | HEART RATE: 84 BPM | DIASTOLIC BLOOD PRESSURE: 87 MMHG | BODY MASS INDEX: 25.44 KG/M2

## 2017-08-29 PROCEDURE — 99239 HOSP IP/OBS DSCHRG MGMT >30: CPT | Performed by: PSYCHIATRY & NEUROLOGY

## 2017-08-29 PROCEDURE — 25000132 ZZH RX MED GY IP 250 OP 250 PS 637: Performed by: NURSE PRACTITIONER

## 2017-08-29 PROCEDURE — 97150 GROUP THERAPEUTIC PROCEDURES: CPT | Mod: GO

## 2017-08-29 RX ADMIN — LITHIUM CARBONATE 450 MG: 450 TABLET, EXTENDED RELEASE ORAL at 08:00

## 2017-08-29 ASSESSMENT — ACTIVITIES OF DAILY LIVING (ADL)
DRESS: STREET CLOTHES
ORAL_HYGIENE: INDEPENDENT
GROOMING: INDEPENDENT
LAUNDRY: WITH SUPERVISION

## 2017-08-29 NOTE — DISCHARGE INSTRUCTIONS
Behavioral Discharge Planning and Instructions      Summary:  You were admitted on 8/3/2017  For Disorganized Thinking/Behaviors.  You were treated by Cassy Anderson NP and Dr. Rosana MD and Dr. Rodney. You were discharged on 8/29/2017 [facility]    You were dually committed to the [facility] and the [facility] on 8/21/2017 and you are being discharged on a Provisional Discharge Agreement which shall remain in effect for the duration of the Commitment which expires on 2/21/2018    You are also court ordered to take the medications that the doctor ordered for you.       Main Diagnosis:   Bipolar 1 Disorder - most recent episode manic with psychosis.     Health Care Follow-up Appointments:   Psychiatry Date/Time: Thursday 9/21/2017 at 8:20 am    Provider: Vonda Choe  Address: [address] [place] [address]  Phone: [phone]  Fax: [phone]    You will be assigned a [facility] . Until that person is assigned, you will work with Hallie from [facility].   Phone: [phone]  Fax: [phone]    [facility] diagnostic intake: Thursday 9/7/2017 at 9:30     : Iraida Schmitz  Address: [facility]  [address], [place] [address]  Phone: [phone]  Fax: [phone]    Day treatment intake: [facility] will call you to schedule the day treatment intake. Ibeth is the .  Phone: [phone]  Fax: [phone]  Attend all scheduled appointments with your outpatient providers. Call at least 24 hours in advance if you need to reschedule an appointment to ensure continued access to your outpatient providers.     Major Treatments, Procedures and Findings:  You were provided with: a psychiatric assessment, assessed for medical stability, medication evaluation and/or management, group therapy and milieu management    Symptoms to Report:  feeling more aggressive, increased confusion, losing more sleep, mood getting worse, thoughts of suicide or not taking your medication    Early warning signs can include: increased depression or anxiety sleep disturbances increased thoughts or behaviors of suicide or self-harm  increased unusual thinking, such as paranoia or hearing voices    Safety and Wellness:  Take all medicines as directed.  Make no changes unless your doctor suggests them.      Follow treatment recommendations.  Refrain from alcohol and non-prescribed drugs.  If there is a concern for safety, call 911.    Resources:   [place] on [place]ness (www.[place].radha.org): 327.536.9819 or 870-908-9334.  Alcoholics Anonymous (www.alcoholics-anonymous.org): Check your phone book for your local chapter.  Suicide Awareness Voices of Education (SAVE) (www.save.org): 774-052-GJCU (4583)  National Suicide Prevention Line (www.mentalhealthmn.org): 068-123-LJVB (2629)  Mental Health Consumer/Survivor Network of [place] (www.mhcsn.net): 264.447.3497 or 287-303-7917  Mental Health Association of [place] (www.mentalhealth.org): 882.264.1161 or 441-423-8338  [place], and [facility] Mobile Crisis Response Team (CRT):  [phone] or [phone]     The treatment team has appreciated the opportunity to work with you.     If you have any questions or concerns our unit number is [phone]  You may be receiving a follow-up phone call within the next three days from a representative from behavioral health.    You have identified the best phone number to reach you as [phone]

## 2017-08-29 NOTE — PLAN OF CARE
Problem: Psychotic Symptoms  Goal: Social and Therapeutic (Psychotic Symptoms)  Signs and symptoms of listed problems will be absent or manageable.      Attended .50 In clinic was pleasant, appropriate. Planned ahead according to direction on simple task. Very pleased with outcome. Plans d/c today.
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
## 2017-08-29 NOTE — PROGRESS NOTES
Pt bright, happy to be leaving tomorrow. Goal for today was to enjoy her last day at the hospital. Does not remember most of what happened during her psychosis. Denies SI, SIB, and all other psychiatric symptoms. Spent most of shift in milieu.     08/28/17 2309   Behavioral Health   Hallucinations denies / not responding to hallucinations   Thinking intact   Orientation person: oriented;place: oriented;date: oriented;time: oriented   Memory baseline memory;other (see comment)  (does not remember what happened during psychosis)   Insight admits / accepts   Judgement intact   Eye Contact at examiner   Affect full range affect   Mood mood is calm   Physical Appearance/Attire attire appropriate to age and situation   Hygiene neglected grooming - unclean body, hair, teeth   Suicidality other (see comments)  (denies)   Self Injury other (see comment)  (denies)   Elopement (N/A)   Activity other (see comment)  (visible/social in milieu)   Speech clear;coherent   Medication Sensitivity no stated side effects;no observed side effects   Psychomotor / Gait balanced;steady   Psycho Education   Type of Intervention 1:1 intervention   Response participates, initiates socially appropriate   Hours 0.5   Treatment Detail check in   Activities of Daily Living   Hygiene/Grooming independent   Oral Hygiene independent   Dress independent   Laundry with supervision   Room Organization independent   Behavioral Health Interventions   Depression maintain safety precautions;monitor need to revise level of observation;maintain safe secure environment;encourage nutrition and hydration;encourage participation / independence with adls;provide emotional support;establish therapeutic relationship;build upon strengths;monitor need for prn medication;monitor confusion, memory loss, decision making ability and reorient / intervene as needed   Social and Therapeutic Interventions (Depression) encourage socialization with peers;encourage effective  boundaries with peers;encourage participation in therapeutic groups and milieu activities   Psychotic Symptoms maintain safety precautions;monitor need to revise level of observation;maintain safe secure environment;reality orientation;simple, clear language;decrease environmental stimulation;redirection of intrusive behaviors;redirection of aggressive behaviors;encourage nutrition and hydration;encourage participation / independence with adls;provide emotional support;establish therapeutic relationship;build upon strengths;monitor need for prn medication;monitor confusion, memory loss, decision making ability and reorient / intervent as needed   Social and Therapeutic Interventions (Psychotic Symptoms) encourage socialization with peers;encourage effective boundaries with peers;encourage participation in therapeutic groups and milieu activities

## 2017-08-29 NOTE — PROGRESS NOTES
Patient was discharged from [facility] [address] [place] to home with father today at 1120. Discharge instructions were reviewed with the patient. She verbalized understanding of them. She denied having any suicidal thoughts at time of discharge. A 30 day supply of medications was sent home with patient.

## 2017-08-29 NOTE — PROGRESS NOTES
Reviewed the provisional discharge with pt. Copy to pt, copy to chart. Copy faxed to court and . Change of status sent to court.

## 2023-06-30 NOTE — PROGRESS NOTES
Status Individual Observation continues for this patient, appears disoriented and drows with poor insight unable to stay asleep, requested a chicken sandwich which was not available, offered and given peanut butter jelly sandwich and juice, awake most of shift, slept approximately 1.5 hours.   Clear

## 2024-07-18 NOTE — PROGRESS NOTES
"[facility], [place]   Psychiatric Progress Note        Interim History:   Reason for Hospitalization: Christine is a 22 year old female with history of Bipolar Disorder with psychosis who was [place] on 8/2 [place] the [place] for worsening agitation and psychotic symptoms. Of note, she is in a court hold, and is currently in the process of being committed.     Subjective: \"My sleep has improved\"     As per today's interview: Patient was seen in the common are of the milieu, and was quite calm, pleasant and cooperative in speaking with me. She wears a head scarf today. She felt her sleep improved last night. Denies any low moods today, and has been trying to use her coping skills to stay calm. Denies any SI/HI/AH/VH. Knows about her court on Monday.          Medications:       melatonin  3 mg Oral At Bedtime     risperiDONE  4 mg Oral At Bedtime     lithium  450 mg Oral Q12H ANA     LORazepam  1 mg Oral At Bedtime          Allergies:   No Known Allergies       Labs:   No results found for this or any previous visit (from the past 48 hour(s)).       Psychiatric Examination:   /82  Pulse 100  Temp 97.5  F (36.4  C) (Oral)  Resp 16  Wt 71.7 kg (158 lb)  LMP  (LMP Unknown)  BMI 24.02 kg/m2  Weight is 158 lbs 0 oz  Body mass index is 24.02 kg/(m^2).    Appearance: Young appearing [place] female, wearing a bright yellow sweatshirt. Adequate hygiene. No acute distress.   Attitude:  Cooperative, calm, and pleasant   Eye Contact:  good  Mood:  good  Affect:  appropriate and in normal range  Speech:  clear, coherent, with adequate reciprocity   Language: Intact  Psychomotor Behavior:  no evidence of tardive dyskinesia, dystonia, or tics  Thought Process:  Overall remained logical, however had a tendency to go off into tangents at times.   Associations:  no loose associations  Thought Content:  no evidence of suicidal ideation or homicidal ideation, no evidence of psychotic thought, no " Verbal order and read back per Matthias Feng MD  Please let the patient know that his heart function was normal on echocardiogram.     This has been fully explained to the patient, who indicates understanding.     auditory hallucinations present and no visual hallucinations present  Insight:  partial  Judgement:  Poor- fair   Oriented to:  time, person, and place  Attention Span and Concentration:  fair  Recent and Remote Memory:  fair  Fund of knowledge: Average  Gait and Station: Normal      Assessment & Plan       Principal Diagnosis:   Bipolar 1 Disorder - most recent episode manic with psychosis.     Assessment:   Christine appears to be calm, cooperative, and appears to be utilizing her coping skills to help control her anxiety and mood. Her sleep seems to have improved last night. Tolerating medications fine, with no rigidity or tremors or any GI discomfort. We discussed her court for Monday. Denies any SI/HI/AH/VH.     Plan:  1.) Bipolar Disorder  - Continue Lithium 450 mg BID  - Continue Risperdal 4 mg HS    2.) Sleep Problems  - Continue Melatonin 3 mg HS  - Continue Ativan 1 mg HS      Legal Status: Court Hold     Safety Assessment:   Checks: Status 15  Precautions: Sexual  Pt has not required locked seclusion or restraints in the past 24 hours to maintain safety, please refer to RN documentation for further details.       The risks, benefits, alternatives and side effects have been discussed and are understood by the patient and other caregivers.       Anticipated Disposition/Discharge Date: Unclear, patient is in the process of commitment with rodriguez. She will likely go to her parent's house.     Attestation:  Spent 20  minutes on encounter, >50% of which was spent in counseling and/or coordination of care, consisting of discussing medications, side effect/risks/benefits, symptom overview, brief therapy, and discussion of commitment issues.     Patient has been seen and evaluated by me,  Luis Carlos Wayne MD